# Patient Record
Sex: MALE | Race: WHITE | NOT HISPANIC OR LATINO | Employment: FULL TIME | URBAN - METROPOLITAN AREA
[De-identification: names, ages, dates, MRNs, and addresses within clinical notes are randomized per-mention and may not be internally consistent; named-entity substitution may affect disease eponyms.]

---

## 2019-09-13 ENCOUNTER — OFFICE VISIT (OUTPATIENT)
Dept: ORTHOPEDICS | Facility: CLINIC | Age: 34
End: 2019-09-13
Payer: COMMERCIAL

## 2019-09-13 ENCOUNTER — OFFICE VISIT (OUTPATIENT)
Dept: URGENT CARE | Facility: CLINIC | Age: 34
End: 2019-09-13
Payer: COMMERCIAL

## 2019-09-13 VITALS
TEMPERATURE: 98 F | RESPIRATION RATE: 20 BRPM | WEIGHT: 188 LBS | OXYGEN SATURATION: 100 % | HEART RATE: 51 BPM | BODY MASS INDEX: 22.2 KG/M2 | HEIGHT: 77 IN | DIASTOLIC BLOOD PRESSURE: 81 MMHG | SYSTOLIC BLOOD PRESSURE: 123 MMHG

## 2019-09-13 DIAGNOSIS — S92.354A CLOSED NONDISPLACED FRACTURE OF FIFTH METATARSAL BONE OF RIGHT FOOT, INITIAL ENCOUNTER: Primary | ICD-10-CM

## 2019-09-13 DIAGNOSIS — S90.31XA CONTUSION OF RIGHT FOOT, INITIAL ENCOUNTER: Primary | ICD-10-CM

## 2019-09-13 PROCEDURE — 3008F BODY MASS INDEX DOCD: CPT | Mod: CPTII,S$GLB,, | Performed by: NURSE PRACTITIONER

## 2019-09-13 PROCEDURE — 99999 PR PBB SHADOW E&M-EST. PATIENT-LVL I: CPT | Mod: PBBFAC,,, | Performed by: PHYSICIAN ASSISTANT

## 2019-09-13 PROCEDURE — 99214 OFFICE O/P EST MOD 30 MIN: CPT | Mod: S$GLB,,, | Performed by: NURSE PRACTITIONER

## 2019-09-13 PROCEDURE — 99214 PR OFFICE/OUTPT VISIT, EST, LEVL IV, 30-39 MIN: ICD-10-PCS | Mod: S$GLB,,, | Performed by: NURSE PRACTITIONER

## 2019-09-13 PROCEDURE — 3008F PR BODY MASS INDEX (BMI) DOCUMENTED: ICD-10-PCS | Mod: CPTII,S$GLB,, | Performed by: NURSE PRACTITIONER

## 2019-09-13 PROCEDURE — 99999 PR PBB SHADOW E&M-EST. PATIENT-LVL I: ICD-10-PCS | Mod: PBBFAC,,, | Performed by: PHYSICIAN ASSISTANT

## 2019-09-13 PROCEDURE — 99203 PR OFFICE/OUTPT VISIT, NEW, LEVL III, 30-44 MIN: ICD-10-PCS | Mod: S$GLB,,, | Performed by: PHYSICIAN ASSISTANT

## 2019-09-13 PROCEDURE — 99203 OFFICE O/P NEW LOW 30 MIN: CPT | Mod: S$GLB,,, | Performed by: PHYSICIAN ASSISTANT

## 2019-09-13 PROCEDURE — 73630 X-RAY EXAM OF FOOT: CPT | Mod: RT,S$GLB,, | Performed by: RADIOLOGY

## 2019-09-13 PROCEDURE — 73630 XR FOOT COMPLETE 3 VIEW RIGHT: ICD-10-PCS | Mod: RT,S$GLB,, | Performed by: RADIOLOGY

## 2019-09-13 NOTE — PROGRESS NOTES
SUBJECTIVE:     Chief Complaint & History of Present Illness:  Demond Hassan is a 34 y.o. year old male who presents for evaluation of constant right foot pain which started 2 days ago. The injury occurred while playing tennis.  The pain is located in the lateral aspect of the foot. The pain is described as achy.  It is aggravated by walking .  Associated symptoms include bruising and swelling.  He denies numbness or tingling.  He was seen in urgent care earlier today and diagnosed with a metatarsal fracture. There is not a history of prior injury or surgery.  The patient does not use an assistive device.      Review of patient's allergies indicates:  No Known Allergies      No current outpatient medications on file.     No current facility-administered medications for this visit.        Past Medical History:   Diagnosis Date    Known health problems: none        Past Surgical History:   Procedure Laterality Date    NO PAST SURGERIES         Vital Signs (Most Recent)  There were no vitals filed for this visit.    Review of Systems:  ROS:  Constitutional: no fever or chills  Eyes: no visual changes  ENT: no nasal congestion or sore throat  Respiratory: no cough or shortness of breath  Cardiovascular: no chest pain or palpitations  Gastrointestinal: no nausea or vomiting, tolerating diet  Genitourinary: no hematuria or dysuria  Integument/Breast: no rash or pruritis  Hematologic/Lymphatic: no easy bruising or lymphadenopathy  Musculoskeletal: no arthralgias or myalgias  Neurological: no seizures or tremors  Behavioral/Psych: no auditory or visual hallucinations  Endocrine: no heat or cold intolerance      OBJECTIVE:     PHYSICAL EXAM:     , General Appearance: Well nourished, well developed, in no acute distress.  CV: 2+ UE and LE distal pulses bilaterally  RESP: Respirations equal and unlabored  GI: Abdomen soft and non-tender  Neurological: Mood & affect are normal.    Right Foot  Skin intact, no  abrasions  No deformity  There is swelling and ecchymosis along the lateral aspect of the foot.  There is mild ecchymosis in the forefoot.  TTP base of the fifth metatarsal  5/5 dorsiflexion, 5/5 plantarflexion  Ankle has FROM  Sensation to light touch intact  2+ DP    RADIOGRAPHS:  X-rays of the right foot, reviewed by me, show nondisplaced fracture of the base of the fifth metatarsal.     ASSESSMENT/PLAN:     34 year old male with right foot fifth metatarsal fracture    - Discussed treatment with walking boot for more support.  As he is going to be doing a lot of traveling, he stated he would prefer a post op shoe.  He was placed in a post op shoe.  He may be full WBAT.  He should use caution, avoid any high impact activity.  Continue rest, ice and elevation.  - Follow up in 6 weeks for x-rays

## 2019-09-13 NOTE — PATIENT INSTRUCTIONS
Please call [phone] if you do not hear from [facility] to get your referral appointment we discussed.   Tylenol for pain.      R.I.C.E.    R.I.C.E. stands for Rest, Ice, Compression, and Elevation. Doing these things helps limit pain and swelling after an injury. R.I.C.E. also helps injuries heal faster. Use R.I.C.E. for sprains, strains, and severe bruises or bumps. Follow the tips on this handout and begin R.I.C.E. as soon as possible after an injury.  ? Rest  Pain is your bodys way of telling you to rest an injured area. Whether you have hurt an elbow, hand, foot, or knee, limiting its use will prevent further injury and help you heal.  ? Ice  Applying ice right after an injury helps prevent swelling and reduce pain. Dont place ice directly on your skin.  · Wrap a cold pack or bag of ice in a thin cloth. Place it over the injured area.  · Ice for 10 minutes every 3 hours. Dont ice for more than 20 minutes at a time.  ? Compression  Putting pressure (compression) on an injury helps prevent swelling and provides support.  · Wrap the injured area firmly with an elastic bandage. If your hand or foot tingles, becomes discolored, or feels cold to the touch, the bandage may be too tight. Rewrap it more loosely.  · If your bandage becomes too loose, rewrap it.  · Do not wear an elastic bandage overnight.  ? Elevation  Keeping an injury elevated helps reduce swelling, pain, and throbbing. Elevation is most effective when the injury is kept elevated higher than the heart.     Call your healthcare provider if you notice any of the following:  · Fingers or toes feel numb, are cold to the touch, or change color  · Skin looks shiny or tight  · Pain, swelling, or bruising worsens and is not improved with elevation   Date Last Reviewed: 9/3/2015  © 6077-8383 [employer]. [address] [place] [address], [place] [address]. All rights reserved. This information is not intended as a substitute for professional medical  care. Always follow your healthcare professional's instructions.        Understanding Fifth Metatarsal Fracture    A fifth metatarsal fracture is a type of broken bone in your foot. You have 5 metatarsals. They are the middle bones in your feet, between your toes and your anklebones (tarsals). The fifth metatarsal connects your smallest toe to your ankle. These bones help with arch support and balance.     How to say it  met--TA-sal   What causes a fifth metatarsal fracture?  A direct blow to the bone is often the cause of a fracture of the fifth metatarsal. That may happen if you drop a heavy object on your foot or land wrong on your foot or ankle. Twisting activities can also break the bone. Pivoting while playing basketball is one example.  Repeatedly placing too much stress on the bone can also cause a fracture of the fifth metatarsal. This is called a stress fracture. People who do physical activities like dancing or running tend to be more prone to stress fractures.  Symptoms of a fifth metatarsal fracture  Sudden pain along the outside of your foot is the main symptom. A stress fracture may develop more slowly. You may feel chronic pain for a period of time. Your foot may also swell up and bruise. You may have trouble walking.  Treatment for a fifth metatarsal fracture  Treatment for this type of fracture depends on where the bone is broken and how severe the breakage is. Healing can take up to several months. Treatment may include:  · Cold therapy. Putting ice on the area may reduce swelling and pain, especially in the first few days after injury.  · Elevation. Propping up the foot so it is above the level of your heart may ease swelling.  · Prescription or over-the-counter pain medicines. These help reduce pain and swelling.  · Immobilization. Devices such as a splint, cast, or walking boot can protect the bone and ease pain. They can help keep the bone in place so it heals properly. You may need to  avoid putting any weight on the broken bone for a period of time. Severe fractures usually need a longer limit on weight-bearing activities.  · Stretching and strengthening exercises. Certain exercises can help you regain flexibility and strength in your foot.  · Surgery. You usually will not need surgery. But you may need it if the bone is broken into 2 or more pieces and is not aligned (displaced), doesnt heal properly, or takes a long time to heal.  Possible complications of a fifth metatarsal fracture  · The bone doesnt heal correctly  · Acute compartment syndrome. This is when pressure builds up in the muscles of the foot and affects blood flow.     When to call your healthcare provider  Call your healthcare provider right away if you have any of these:  · Fever of 100.4°F (38°C) or higher, or as directed  · Symptoms that dont get better, or get worse  · Numbness or coldness in your foot  · Toe nails that turn blue or grey in color  · New symptoms   Date Last Reviewed: 3/10/2016  © 5034-1370 [employer]. [address] [place] [address]. All rights reserved. This information is not intended as a substitute for professional medical care. Always follow your healthcare professional's instructions.        Closed Toe Fracture  Your toe is broken (fractured). This causes local pain, swelling, and sometimes bruising. This injury usually takes about 4 to 6 weeks to heal, but can sometimes take longer. Toe injuries are often treated by taping the injured toe to the next one (jamila taping). This protects the injured toe and holds it in position.     If the toenail has been severely injured, it may fall off in 1 to 2 weeks. It takes up to 12 months for a new toenail to grow back.  Home care  Follow these guidelines when caring for yourself at home:  · You may be given a cast shoe to wear to keep your toe from moving. If not, you can use a sandal or any shoe that doesnt put pressure on the  injured toe until the swelling and pain go away. If using a sandal, be careful not to strike your foot against anything. Another injury could make the fracture worse. If you were given crutches, dont put full weight on the injured foot until you can do so without pain, or as directed by your healthcare provider.  · Keep your foot elevated to reduce pain and swelling. When sleeping, put a pillow under the injured leg. When sitting, support the injured leg so it is above your waist. This is very important during the first 2 days (48 hours).  · Put an ice pack on the injured area. Do this for 20 minutes every 1 to 2 hours the first day for pain relief. You can make an ice pack by wrapping a plastic bag of ice cubes in a thin towel. As the ice melts, be careful that any cloth or paper tape doesnt get wet. Continue using the ice pack 3 to 4 times a day for the next 2 days. Then use the ice pack as needed to ease pain and swelling.  · If buddy tape was used and it becomes wet or dirty, change it. You may replace it with paper, plastic, or cloth tape. Cloth tape and paper tapes must be kept dry.  · You may use acetaminophen or ibuprofen to control pain, unless another pain medicine was prescribed. If you have chronic liver or kidney disease, talk with your healthcare provider before using these medicines. Also talk with your provider if youve had a stomach ulcer or gastrointestinal bleeding.  · You may return to sports or physical education activities after 4 weeks when you can run without pain, or as directed by your healthcare provider.  Follow-up care  Follow up with your healthcare provider in 1 week, or as advised. This is to make sure the bone is healing the way it should.  X-rays may be taken. You will be told of any new findings that may affect your care.  When to seek medical advice  Call your healthcare provider right away if any of these occur:  · Pain or swelling gets worse  · The cast/splint cracks  · The  cast and padding get wet and stays wet more than 24 hours  · Bad odor from the cast/splint or wound fluid stains the cast  · Tightness or pressure under the cast/splint gets worse  · Toe becomes cold, blue, numb, or tingly  · You cant move the toe  · Signs of infection: fever, redness, warmth, swelling, or drainage from the wound or cast  · Fever of 100.4ºF (38ºC) or higher, or as directed by your healthcare provider  Date Last Reviewed: 2/1/2017 © 2000-2017 [employer]. [address] [place] [address]. All rights reserved. This information is not intended as a substitute for professional medical care. Always follow your healthcare professional's instructions.

## 2019-09-13 NOTE — PROGRESS NOTES
"Subjective:       Patient ID: Demond Hassan is a 34 y.o. male.    Vitals:  height is 6' 5" (1.956 m) and weight is 85.3 kg (188 lb). His oral temperature is 98.4 °F (36.9 °C). His blood pressure is 123/81 and his pulse is 51 (abnormal). His respiration is 20 and oxygen saturation is 100%.     Chief Complaint: Foot Injury (right foot)    Patient come in for R foot injury that happened on last Wednesday night, September 11th. Patient was playing tennis and landing on the right side of his R foot, he feels like he twisted his foot. He has pain when he puts pain on the right side of the R foot.    Foot Injury    The incident occurred 2 days ago. The incident occurred at the park ([facility]). The injury mechanism was a twisting injury. The pain is present in the right foot. The quality of the pain is described as aching. The pain is at a severity of 7/10 (when putting pressure on foot). The pain is moderate. The pain has been constant since onset. Associated symptoms include an inability to bear weight. Pertinent negatives include no loss of motion, loss of sensation, muscle weakness, numbness or tingling. The symptoms are aggravated by weight bearing. He has tried ice and elevation for the symptoms. The treatment provided no relief.       Constitution: Negative for fatigue.   HENT: Negative for facial swelling and facial trauma.    Neck: Negative for neck stiffness.   Cardiovascular: Negative for chest trauma.   Eyes: Negative for eye trauma, double vision and blurred vision.   Gastrointestinal: Negative for abdominal trauma, abdominal pain and rectal bleeding.   Genitourinary: Negative for hematuria, genital trauma and pelvic pain.   Musculoskeletal: Positive for pain, trauma and joint swelling. Negative for abnormal ROM of joint and pain with walking.   Skin: Positive for color change. Negative for wound, abrasion and laceration.   Neurological: Negative for dizziness, history of vertigo, light-headedness, " coordination disturbances, altered mental status, loss of consciousness and numbness.   Hematologic/Lymphatic: Negative for history of bleeding disorder.   Psychiatric/Behavioral: Negative for altered mental status.       Objective:      Physical Exam   Constitutional: He is oriented to person, place, and time. He appears well-developed and well-nourished. No distress.   HENT:   Head: Normocephalic and atraumatic.   Right Ear: External ear normal.   Left Ear: External ear normal.   Nose: Nose normal.   Mouth/Throat: Oropharynx is clear and moist.   Eyes: Pupils are equal, round, and reactive to light. Conjunctivae and EOM are normal. Right eye exhibits no discharge. Left eye exhibits no discharge. No scleral icterus.   Neck: Normal range of motion. Neck supple.   Pulmonary/Chest: Effort normal.   Abdominal: He exhibits no distension.   Musculoskeletal: Normal range of motion.        Right foot: There is normal range of motion, no tenderness, no bony tenderness, no swelling, normal capillary refill, no crepitus, no deformity and no laceration.   Mild ecchymoses to the distal fifth metatarsal area, no decreased rom, no distal numbness or tingling   Neurological: He is alert and oriented to person, place, and time.   Skin: Skin is dry. Capillary refill takes less than 2 seconds. He is not diaphoretic.   Psychiatric: He has a normal mood and affect. His behavior is normal. Judgment and thought content normal.   Nursing note and vitals reviewed.      Assessment:       1. Contusion of right foot, initial encounter        Plan:         Contusion of right foot, initial encounter  -     X-Ray Foot Complete 3 view Right; Future; Expected date: 09/13/2019    Xray indicates proximal fifth metatarsal fracture.  I offered pt walking boot vs nonweight bearing with crutches.  Pt declined both stating that he was concerned about cost and also that he isn't having significant pain and would like to confer with an orthopedic provider.   He understands that currently nondisplaced fracture may become more displaced secondary to walking on foot without walking boot.  He also declined pain meds.  Pt requested disk of images, we were unable to comply secondary to software issues.

## 2019-10-25 ENCOUNTER — HOSPITAL ENCOUNTER (OUTPATIENT)
Dept: RADIOLOGY | Facility: HOSPITAL | Age: 34
Discharge: HOME | End: 2019-10-25
Attending: PHYSICIAN ASSISTANT
Payer: COMMERCIAL

## 2019-10-25 ENCOUNTER — OFFICE VISIT (OUTPATIENT)
Dept: ORTHOPEDICS | Facility: CLINIC | Age: 34
End: 2019-10-25
Payer: COMMERCIAL

## 2019-10-25 VITALS — WEIGHT: 180.13 LBS | BODY MASS INDEX: 21.94 KG/M2 | HEIGHT: 76 IN

## 2019-10-25 DIAGNOSIS — S92.354A CLOSED NONDISPLACED FRACTURE OF FIFTH METATARSAL BONE OF RIGHT FOOT, INITIAL ENCOUNTER: ICD-10-CM

## 2019-10-25 DIAGNOSIS — S92.354D CLOSED NONDISPLACED FRACTURE OF FIFTH METATARSAL BONE OF RIGHT FOOT WITH ROUTINE HEALING, SUBSEQUENT ENCOUNTER: Primary | ICD-10-CM

## 2019-10-25 PROCEDURE — 99999 PR PBB SHADOW E&M-EST. PATIENT-LVL III: ICD-10-PCS | Mod: PBBFAC,,, | Performed by: PHYSICIAN ASSISTANT

## 2019-10-25 PROCEDURE — 99213 PR OFFICE/OUTPT VISIT, EST, LEVL III, 20-29 MIN: ICD-10-PCS | Mod: S$GLB,,, | Performed by: PHYSICIAN ASSISTANT

## 2019-10-25 PROCEDURE — 99999 PR PBB SHADOW E&M-EST. PATIENT-LVL III: CPT | Mod: PBBFAC,,, | Performed by: PHYSICIAN ASSISTANT

## 2019-10-25 PROCEDURE — 3008F PR BODY MASS INDEX (BMI) DOCUMENTED: ICD-10-PCS | Mod: CPTII,S$GLB,, | Performed by: PHYSICIAN ASSISTANT

## 2019-10-25 PROCEDURE — 3008F BODY MASS INDEX DOCD: CPT | Mod: CPTII,S$GLB,, | Performed by: PHYSICIAN ASSISTANT

## 2019-10-25 PROCEDURE — 99213 OFFICE O/P EST LOW 20 MIN: CPT | Mod: S$GLB,,, | Performed by: PHYSICIAN ASSISTANT

## 2019-10-25 PROCEDURE — 73630 XR FOOT COMPLETE 3 VIEW RIGHT: ICD-10-PCS | Mod: 26,RT,, | Performed by: RADIOLOGY

## 2019-10-25 PROCEDURE — 73630 X-RAY EXAM OF FOOT: CPT | Mod: 26,RT,, | Performed by: RADIOLOGY

## 2019-10-25 PROCEDURE — 73630 X-RAY EXAM OF FOOT: CPT | Mod: TC,RT

## 2019-10-25 NOTE — PROGRESS NOTES
Subjective:      Patient ID: Demond Hassan is a 34 y.o. male.    Chief Complaint: Pain of the Right Foot    HPI  He is here for follow up evaluation of right foot fifth metatarsal fracture.  His injury occurred 9/11/2019.  He states his pain and swelling has improved.  Pain is a 2/10 today.  He did go on a trip to [place] and did a lot of walking however he wore the post op shoe.  He does have some discomfort in the rest of his foot with increased walking.  He has not been taking anything for pain.      Review of Systems   Constitution: Negative for chills and fever.   HENT: Negative for congestion.    Eyes: Negative for double vision and redness.   Cardiovascular: Negative for chest pain and palpitations.   Respiratory: Negative for shortness of breath.    Hematologic/Lymphatic: Does not bruise/bleed easily.   Skin: Negative for rash.   Musculoskeletal:        Foot pain   Gastrointestinal: Negative for abdominal pain, nausea and vomiting.   Neurological: Negative for dizziness and seizures.   Psychiatric/Behavioral: Negative for altered mental status.   Allergic/Immunologic: Negative for persistent infections.         Objective:         General    Vitals reviewed.  Constitutional: He is oriented to person, place, and time. He appears well-developed and well-nourished. No distress.   HENT:   Head: Atraumatic.   Nose: Nose normal.   Eyes: Conjunctivae are normal.   Pulmonary/Chest: Effort normal.   Neurological: He is alert and oriented to person, place, and time.   Psychiatric: He has a normal mood and affect.     General Musculoskeletal Exam   Gait: normal     Right Ankle/Foot Exam     Inspection   Scars: absent  Deformity: absent  Erythema: absent  Bruising: Foot - absent    Tenderness   The patient is tender to palpation of the fifth metatarsal base.    Pain   The patient exhibits pain of the fifth metatarsal base.    Range of Motion   The patient has normal right ankle ROM.    Alignment   Midfoot Alignment:  normal  Forefoot Alignment: normal    Other   Sensation: normal    Comments:  No swelling, tenderness is minimal        Muscle Strength   Right Lower Extremity   Anterior tibial:  5/5/5  FHL: 5/5  Left Lower Extremity   EHL:  5/5    Vascular Exam     Right Pulses  Dorsalis Pedis:      2+            IMAGING:  X-rays of the right foot, reviewed by me, show nondisplaced fracture of the base of the fifth metatarsal healing in satisfactory position          Assessment:       Encounter Diagnosis   Name Primary?    Closed nondisplaced fracture of fifth metatarsal bone of right foot with routine healing, subsequent encounter Yes          Plan:       Demond was seen today for pain.    Diagnoses and all orders for this visit:    Closed nondisplaced fracture of fifth metatarsal bone of right foot with routine healing, subsequent encounter  -     X-Ray Foot Complete Right; Future    - Pain and swelling has improved.  He may wean into comfortable supportive shoe.  Continue to avoid any high impact activities.  Nsaids prn pain.  - Follow up one month for x-rays.

## 2019-11-26 ENCOUNTER — OFFICE VISIT (OUTPATIENT)
Dept: ORTHOPEDICS | Facility: CLINIC | Age: 34
End: 2019-11-26
Payer: COMMERCIAL

## 2019-11-26 ENCOUNTER — HOSPITAL ENCOUNTER (OUTPATIENT)
Dept: RADIOLOGY | Facility: HOSPITAL | Age: 34
Discharge: HOME | End: 2019-11-26
Attending: PHYSICIAN ASSISTANT
Payer: COMMERCIAL

## 2019-11-26 VITALS
BODY MASS INDEX: 22.85 KG/M2 | SYSTOLIC BLOOD PRESSURE: 130 MMHG | DIASTOLIC BLOOD PRESSURE: 74 MMHG | HEART RATE: 62 BPM | WEIGHT: 187.63 LBS | HEIGHT: 76 IN

## 2019-11-26 DIAGNOSIS — S92.354D CLOSED NONDISPLACED FRACTURE OF FIFTH METATARSAL BONE OF RIGHT FOOT WITH ROUTINE HEALING, SUBSEQUENT ENCOUNTER: Primary | ICD-10-CM

## 2019-11-26 DIAGNOSIS — S92.354D CLOSED NONDISPLACED FRACTURE OF FIFTH METATARSAL BONE OF RIGHT FOOT WITH ROUTINE HEALING, SUBSEQUENT ENCOUNTER: ICD-10-CM

## 2019-11-26 PROCEDURE — 99999 PR PBB SHADOW E&M-EST. PATIENT-LVL III: CPT | Mod: PBBFAC,,, | Performed by: PHYSICIAN ASSISTANT

## 2019-11-26 PROCEDURE — 73630 XR FOOT COMPLETE 3 VIEW RIGHT: ICD-10-PCS | Mod: 26,RT,, | Performed by: RADIOLOGY

## 2019-11-26 PROCEDURE — 73630 X-RAY EXAM OF FOOT: CPT | Mod: TC,RT

## 2019-11-26 PROCEDURE — 99213 PR OFFICE/OUTPT VISIT, EST, LEVL III, 20-29 MIN: ICD-10-PCS | Mod: S$GLB,,, | Performed by: PHYSICIAN ASSISTANT

## 2019-11-26 PROCEDURE — 73630 X-RAY EXAM OF FOOT: CPT | Mod: 26,RT,, | Performed by: RADIOLOGY

## 2019-11-26 PROCEDURE — 3008F PR BODY MASS INDEX (BMI) DOCUMENTED: ICD-10-PCS | Mod: CPTII,S$GLB,, | Performed by: PHYSICIAN ASSISTANT

## 2019-11-26 PROCEDURE — 99213 OFFICE O/P EST LOW 20 MIN: CPT | Mod: S$GLB,,, | Performed by: PHYSICIAN ASSISTANT

## 2019-11-26 PROCEDURE — 99999 PR PBB SHADOW E&M-EST. PATIENT-LVL III: ICD-10-PCS | Mod: PBBFAC,,, | Performed by: PHYSICIAN ASSISTANT

## 2019-11-26 PROCEDURE — 3008F BODY MASS INDEX DOCD: CPT | Mod: CPTII,S$GLB,, | Performed by: PHYSICIAN ASSISTANT

## 2019-11-26 RX ORDER — ZOLPIDEM TARTRATE 10 MG/1
5 TABLET ORAL NIGHTLY PRN
COMMUNITY
End: 2022-05-18

## 2019-11-26 RX ORDER — ALPRAZOLAM 0.25 MG/1
0.25 TABLET ORAL 3 TIMES DAILY
COMMUNITY
End: 2022-05-18 | Stop reason: SDUPTHER

## 2019-11-26 NOTE — PROGRESS NOTES
"Patient ID: Demond Hassan is a 34 y.o. male.    Chief Complaint: Pain of the Right Foot      HISTORY:  Demond Hassan is a 34 y.o. male who returns to me today for follow up of right foot metatarsal fracture.  He was last seen by me 10/25/2019.  Today he is doing well, but notes mild pain and stiffness in the morning.  He has weaned out of the post op shoe.  His pain seems to improve throughout the day.  He denies any new injury, numbness or tingling or weakness.       PMH/PSH/FamHx/SocHx:    Unchanged from prior visit.    ROS:  Constitution: Negative for chills, fever and weakness.   Cardiovascular: Negative for chest pain.   Respiratory: Negative for cough and shortness of breath.   Hematologic/Lymphatic: Negative for bleeding problem. Does not bruise/bleed easily.   Skin: Negative for color change, itching and poor wound healing.   Musculoskeletal: Positive for right foot pain  Gastrointestinal: Negative for heartburn.   Neurological: Negative for dizziness, focal weakness, numbness, paresthesias and sensory change.   Psychiatric/Behavioral: The patient is not nervous/anxious.   Allergic/Immunologic: Negative for environmental allergies and persistent infections.     PHYSICAL EXAM:   /74 (BP Location: Left arm, Patient Position: Sitting, BP Method: Medium (Automatic))   Pulse 62   Ht 6' 4" (1.93 m)   Wt 85.1 kg (187 lb 9.8 oz)   BMI 22.84 kg/m²   Right Foot  There is no deformity.  Skin intact, no swelling or erythema  Very mild TTP along base of fifth metatarsal    IMAGING: Xrays of the right foot, reviewed by me, show stable nondisplaced fracture of the base of the fifth metatarsal minimal healing    ASSESSMENT/PLAN:    Demond was seen today for pain.    Diagnoses and all orders for this visit:    Closed nondisplaced fracture of fifth metatarsal bone of right foot with routine healing, subsequent encounter  -     X-Ray Foot Complete Right; Future        - Recommend continue to avoid high impact " activity.  He may continue low impact activities as tolerated.  Follow up about 6 weeks for xrays.

## 2020-01-10 ENCOUNTER — OFFICE VISIT (OUTPATIENT)
Dept: ORTHOPEDICS | Facility: CLINIC | Age: 35
End: 2020-01-10
Payer: COMMERCIAL

## 2020-01-10 ENCOUNTER — HOSPITAL ENCOUNTER (OUTPATIENT)
Dept: RADIOLOGY | Facility: HOSPITAL | Age: 35
Discharge: HOME | End: 2020-01-10
Attending: PHYSICIAN ASSISTANT
Payer: COMMERCIAL

## 2020-01-10 VITALS
HEART RATE: 51 BPM | DIASTOLIC BLOOD PRESSURE: 77 MMHG | BODY MASS INDEX: 22.98 KG/M2 | WEIGHT: 188.69 LBS | HEIGHT: 76 IN | SYSTOLIC BLOOD PRESSURE: 132 MMHG

## 2020-01-10 DIAGNOSIS — S92.354D CLOSED NONDISPLACED FRACTURE OF FIFTH METATARSAL BONE OF RIGHT FOOT WITH ROUTINE HEALING, SUBSEQUENT ENCOUNTER: ICD-10-CM

## 2020-01-10 DIAGNOSIS — S92.354D CLOSED NONDISPLACED FRACTURE OF FIFTH METATARSAL BONE OF RIGHT FOOT WITH ROUTINE HEALING, SUBSEQUENT ENCOUNTER: Primary | ICD-10-CM

## 2020-01-10 PROCEDURE — 99999 PR PBB SHADOW E&M-EST. PATIENT-LVL III: ICD-10-PCS | Mod: PBBFAC,,, | Performed by: PHYSICIAN ASSISTANT

## 2020-01-10 PROCEDURE — 99213 PR OFFICE/OUTPT VISIT, EST, LEVL III, 20-29 MIN: ICD-10-PCS | Mod: S$GLB,,, | Performed by: PHYSICIAN ASSISTANT

## 2020-01-10 PROCEDURE — 99999 PR PBB SHADOW E&M-EST. PATIENT-LVL III: CPT | Mod: PBBFAC,,, | Performed by: PHYSICIAN ASSISTANT

## 2020-01-10 PROCEDURE — 73630 X-RAY EXAM OF FOOT: CPT | Mod: 26,RT,, | Performed by: RADIOLOGY

## 2020-01-10 PROCEDURE — 73630 X-RAY EXAM OF FOOT: CPT | Mod: TC,RT

## 2020-01-10 PROCEDURE — 73630 XR FOOT COMPLETE 3 VIEW RIGHT: ICD-10-PCS | Mod: 26,RT,, | Performed by: RADIOLOGY

## 2020-01-10 PROCEDURE — 3008F PR BODY MASS INDEX (BMI) DOCUMENTED: ICD-10-PCS | Mod: CPTII,S$GLB,, | Performed by: PHYSICIAN ASSISTANT

## 2020-01-10 PROCEDURE — 99213 OFFICE O/P EST LOW 20 MIN: CPT | Mod: S$GLB,,, | Performed by: PHYSICIAN ASSISTANT

## 2020-01-10 PROCEDURE — 3008F BODY MASS INDEX DOCD: CPT | Mod: CPTII,S$GLB,, | Performed by: PHYSICIAN ASSISTANT

## 2020-01-10 NOTE — PROGRESS NOTES
"Patient ID: Demond Hassan is a 34 y.o. male.    Chief Complaint: Pain of the Right Foot      HISTORY:  Demond Hassan is a 34 y.o. male who returns to me today for follow up of right foot metatarsal fracture.  He was last seen by me 11/26/2019.  Today he is doing well, but notes occasional pain in his foot.  He does have days when he does not have any pain.  The injury occurred 9/11/2019.  He denies new injury.  He has avoided returning to sports and high impact activity.  He denies swelling.      PMH/PSH/FamHx/SocHx:    Unchanged from prior visit.    ROS:  Constitution: Negative for chills, fever and weakness.   Cardiovascular: Negative for chest pain.   Respiratory: Negative for cough and shortness of breath.   Hematologic/Lymphatic: Negative for bleeding problem. Does not bruise/bleed easily.   Skin: Negative for color change, itching and poor wound healing.   Musculoskeletal: Positive for right foot pain  Gastrointestinal: Negative for heartburn.   Neurological: Negative for dizziness, focal weakness, numbness, paresthesias and sensory change.   Psychiatric/Behavioral: The patient is not nervous/anxious.   Allergic/Immunologic: Negative for environmental allergies and persistent infections.     PHYSICAL EXAM:   /77 (BP Location: Left arm, Patient Position: Sitting, BP Method: Medium (Automatic))   Pulse (!) 51   Ht 6' 4" (1.93 m)   Wt 85.6 kg (188 lb 11.4 oz)   BMI 22.97 kg/m²   Right Foot  Skin intact, no swelling or erythema  No deformity  Mild TTP base fifth metatarsal  FROM foot and ankle  Sensation to light touch intact  2+ DP    IMAGING: X-rays of the right foot, personally reviewed by me, demonstrate healing fracture of base of fifth metatarsal.  No fracture or dislocation.    ASSESSMENT/PLAN:    Demond was seen today for pain.    Diagnoses and all orders for this visit:    Closed nondisplaced fracture of fifth metatarsal bone of right foot with routine healing, subsequent encounter  -     " X-Ray Foot Complete Right; Future      - Recommend continue activity as tolerated.  Use caution with high impact activity as he still has occasional pain in his foot.  Consider vitamin otc supplementation.  Follow up in 2 months.

## 2020-03-10 ENCOUNTER — HOSPITAL ENCOUNTER (OUTPATIENT)
Dept: RADIOLOGY | Facility: HOSPITAL | Age: 35
Discharge: HOME | End: 2020-03-10
Attending: PHYSICIAN ASSISTANT
Payer: COMMERCIAL

## 2020-03-10 ENCOUNTER — OFFICE VISIT (OUTPATIENT)
Dept: ORTHOPEDICS | Facility: CLINIC | Age: 35
End: 2020-03-10
Payer: COMMERCIAL

## 2020-03-10 VITALS — BODY MASS INDEX: 23.22 KG/M2 | HEIGHT: 76 IN | WEIGHT: 190.69 LBS

## 2020-03-10 DIAGNOSIS — S92.354D CLOSED NONDISPLACED FRACTURE OF FIFTH METATARSAL BONE OF RIGHT FOOT WITH ROUTINE HEALING, SUBSEQUENT ENCOUNTER: ICD-10-CM

## 2020-03-10 DIAGNOSIS — S92.354D CLOSED NONDISPLACED FRACTURE OF FIFTH METATARSAL BONE OF RIGHT FOOT WITH ROUTINE HEALING, SUBSEQUENT ENCOUNTER: Primary | ICD-10-CM

## 2020-03-10 PROCEDURE — 99213 OFFICE O/P EST LOW 20 MIN: CPT | Mod: S$GLB,,, | Performed by: PHYSICIAN ASSISTANT

## 2020-03-10 PROCEDURE — 99213 PR OFFICE/OUTPT VISIT, EST, LEVL III, 20-29 MIN: ICD-10-PCS | Mod: S$GLB,,, | Performed by: PHYSICIAN ASSISTANT

## 2020-03-10 PROCEDURE — 73630 X-RAY EXAM OF FOOT: CPT | Mod: TC,RT

## 2020-03-10 PROCEDURE — 3008F BODY MASS INDEX DOCD: CPT | Mod: CPTII,S$GLB,, | Performed by: PHYSICIAN ASSISTANT

## 2020-03-10 PROCEDURE — 99999 PR PBB SHADOW E&M-EST. PATIENT-LVL III: ICD-10-PCS | Mod: PBBFAC,,, | Performed by: PHYSICIAN ASSISTANT

## 2020-03-10 PROCEDURE — 3008F PR BODY MASS INDEX (BMI) DOCUMENTED: ICD-10-PCS | Mod: CPTII,S$GLB,, | Performed by: PHYSICIAN ASSISTANT

## 2020-03-10 PROCEDURE — 73630 XR FOOT COMPLETE 3 VIEW RIGHT: ICD-10-PCS | Mod: 26,RT,, | Performed by: RADIOLOGY

## 2020-03-10 PROCEDURE — 99999 PR PBB SHADOW E&M-EST. PATIENT-LVL III: CPT | Mod: PBBFAC,,, | Performed by: PHYSICIAN ASSISTANT

## 2020-03-10 PROCEDURE — 73630 X-RAY EXAM OF FOOT: CPT | Mod: 26,RT,, | Performed by: RADIOLOGY

## 2020-03-10 NOTE — PROGRESS NOTES
"Patient ID: Demond Hassan is a 34 y.o. male.    Chief Complaint: Pain of the Right Foot      HISTORY:  Demond Hassan is a 34 y.o. male who returns to me today for follow up of right foot metatarsal fracture.  He was last seen by me 1/10/2020.  Today he is doing well, but notes only occasional slight discomfort.  He has resumed normal activities and has put increasing stress on his foot.  There has been no new injury.  It has been about  6 months since the original injury.  No swelling, numbness or tingling.      PMH/PSH/FamHx/SocHx:    Unchanged from prior visit.    ROS:  Constitution: Negative for chills, fever and weakness.   Cardiovascular: Negative for chest pain.   Respiratory: Negative for cough and shortness of breath.   Hematologic/Lymphatic: Negative for bleeding problem. Does not bruise/bleed easily.   Skin: Negative for color change, itching and poor wound healing.   Musculoskeletal: Positive for right foot pain  Gastrointestinal: Negative for heartburn.   Neurological: Negative for dizziness, focal weakness, numbness, paresthesias and sensory change.   Psychiatric/Behavioral: The patient is not nervous/anxious.   Allergic/Immunologic: Negative for environmental allergies and persistent infections.     PHYSICAL EXAM:   Ht 6' 4" (1.93 m)   Wt 86.5 kg (190 lb 11.2 oz)   BMI 23.21 kg/m²   Right foot  Skin intact, no swelling   FROM foot and ankle  No TTP base fifth metatarsal  Sensation to light touch intact    IMAGING: X-rays of the right foot, personally reviewed by me, demonstrate well healed fracture of the base of the fifth metatarsal.    ASSESSMENT/PLAN:    Demond was seen today for pain.    Diagnoses and all orders for this visit:    Closed nondisplaced fracture of fifth metatarsal bone of right foot with routine healing, subsequent encounter  -     X-Ray Foot Complete Right; Future      - Continue activities as tolerated.  Follow up as needed     "

## 2020-05-14 ENCOUNTER — TELEPHONE (OUTPATIENT)
Dept: URGENT CARE | Facility: CLINIC | Age: 35
End: 2020-05-14

## 2020-05-14 ENCOUNTER — OFFICE VISIT (OUTPATIENT)
Dept: URGENT CARE | Facility: CLINIC | Age: 35
End: 2020-05-14
Payer: COMMERCIAL

## 2020-05-14 VITALS
DIASTOLIC BLOOD PRESSURE: 96 MMHG | SYSTOLIC BLOOD PRESSURE: 143 MMHG | BODY MASS INDEX: 22.53 KG/M2 | WEIGHT: 185 LBS | TEMPERATURE: 98 F | OXYGEN SATURATION: 97 % | HEIGHT: 76 IN | HEART RATE: 82 BPM

## 2020-05-14 DIAGNOSIS — Z20.822 EXPOSURE TO COVID-19 VIRUS: Primary | ICD-10-CM

## 2020-05-14 LAB
SARS-COV-2 IGG SERPLBLD QL IA.RAPID: NEGATIVE
SARS-COV-2 RNA RESP QL NAA+PROBE: NOT DETECTED

## 2020-05-14 PROCEDURE — 99211 OFF/OP EST MAY X REQ PHY/QHP: CPT | Mod: S$GLB,,, | Performed by: EMERGENCY MEDICINE

## 2020-05-14 PROCEDURE — 99211 PR OFFICE/OUTPT VISIT, EST, LEVL I: ICD-10-PCS | Mod: S$GLB,,, | Performed by: EMERGENCY MEDICINE

## 2020-05-14 PROCEDURE — 86769 SARS-COV-2 COVID-19 ANTIBODY: CPT

## 2020-05-14 PROCEDURE — U0002 COVID-19 LAB TEST NON-CDC: HCPCS

## 2020-05-14 NOTE — PATIENT INSTRUCTIONS
WE WILL CALL YOU WITH RESULTS OF   1:COVID SWAB  2:COVID ANTIBODY TESTING    Guidelines for General Prevention of COVID-19    o Take steps to protect yourself from COVID-19. Perform hand hygiene frequently. Wash your hands often with soap and water for at least 20 seconds of use and alcohol-based hand , covering all surfaces of your hands and rubbing them together until they feel dry.  o Avoid touching your eyes, nose, and mouth with unwashed hands.  o Avoid close contact with people and stay home if youre sick, except to get medical care.   o Cover coughs and sneezes with a tissue, or use the inside of your elbow. Immediately wash your hands or use hand .     For more information, see CDC link below:    https://www.cdc.gov/coronavirus/2019-ncov/hcp/guidance-prevent-spread.html#precautions

## 2020-05-14 NOTE — PROGRESS NOTES
"Subjective:       Patient ID: Demond Hassan is a 34 y.o. male.    Vitals:  height is 6' 4" (1.93 m) and weight is 83.9 kg (185 lb). His temperature is 97.7 °F (36.5 °C). His blood pressure is 143/96 (abnormal) and his pulse is 82. His oxygen saturation is 97%.     Chief Complaint: COVID-19 Concerns    Patient would like covid testing.  Patient visiting his parents and would luike to be tested. He is also requesting antibody testing.  Asymptomatic at this time.    URI    This is a new problem. The current episode started today. Pertinent negatives include no chest pain, congestion, coughing, diarrhea, dysuria, headaches, nausea, rash, sore throat or vomiting.       Constitution: Negative. Negative for chills, fatigue and fever.   HENT: Negative for congestion and sore throat.    Neck: Negative for painful lymph nodes.   Cardiovascular: Negative for chest pain and leg swelling.   Eyes: Negative.  Negative for double vision and blurred vision.   Respiratory: Negative.  Negative for cough and shortness of breath.    Gastrointestinal: Negative for nausea, vomiting and diarrhea.   Genitourinary: Negative.  Negative for dysuria, frequency and urgency.   Musculoskeletal: Negative for joint pain, joint swelling, muscle cramps and muscle ache.   Skin: Negative for color change, pale and rash.   Allergic/Immunologic: Negative.  Negative for seasonal allergies.   Neurological: Negative for dizziness, history of vertigo, light-headedness, passing out and headaches.   Hematologic/Lymphatic: Negative for swollen lymph nodes, easy bruising/bleeding and history of blood clots. Does not bruise/bleed easily.   Psychiatric/Behavioral: Negative for nervous/anxious, sleep disturbance and depression. The patient is not nervous/anxious.        Objective:      Physical Exam   Constitutional: He is oriented to person, place, and time. He appears well-developed and well-nourished. He is cooperative.  Non-toxic appearance. He does not have " a sickly appearance. He does not appear ill. No distress.   HENT:   Head: Normocephalic and atraumatic.   Right Ear: Hearing, tympanic membrane, external ear and ear canal normal.   Left Ear: Hearing, tympanic membrane, external ear and ear canal normal.   Nose: Nose normal. No mucosal edema, rhinorrhea or nasal deformity. No epistaxis. Right sinus exhibits no maxillary sinus tenderness and no frontal sinus tenderness. Left sinus exhibits no maxillary sinus tenderness and no frontal sinus tenderness.   Mouth/Throat: Uvula is midline, oropharynx is clear and moist and mucous membranes are normal. No trismus in the jaw. Normal dentition. No uvula swelling. No oropharyngeal exudate, posterior oropharyngeal edema or posterior oropharyngeal erythema.   Eyes: Conjunctivae and lids are normal. No scleral icterus.   Neck: Trachea normal, full passive range of motion without pain and phonation normal. Neck supple. No neck rigidity. No edema and no erythema present.   Cardiovascular: Normal rate, regular rhythm, normal heart sounds, intact distal pulses and normal pulses.   Pulmonary/Chest: Effort normal and breath sounds normal. No respiratory distress. He has no decreased breath sounds. He has no rhonchi.   Abdominal: Normal appearance.   Musculoskeletal: Normal range of motion. He exhibits no edema or deformity.   Neurological: He is alert and oriented to person, place, and time. He exhibits normal muscle tone. Coordination normal.   Skin: Skin is warm, dry, intact, not diaphoretic and not pale.   Psychiatric: He has a normal mood and affect. His speech is normal and behavior is normal. Judgment and thought content normal. Cognition and memory are normal.   Nursing note and vitals reviewed.        Assessment:       1. Exposure to Covid-19 Virus        Plan:         Exposure to Covid-19 Virus  -     COVID-19 (SARS CoV-2) IgG Antibody  -     COVID-19 Routine Screening          Patient Instructions   WE WILL CALL YOU WITH  RESULTS OF   1:COVID SWAB  2:COVID ANTIBODY TESTING    Guidelines for General Prevention of COVID-19    o Take steps to protect yourself from COVID-19. Perform hand hygiene frequently. Wash your hands often with soap and water for at least 20 seconds of use and alcohol-based hand , covering all surfaces of your hands and rubbing them together until they feel dry.  o Avoid touching your eyes, nose, and mouth with unwashed hands.  o Avoid close contact with people and stay home if youre sick, except to get medical care.   o Cover coughs and sneezes with a tissue, or use the inside of your elbow. Immediately wash your hands or use hand .     For more information, see CDC link below:    https://www.cdc.gov/coronavirus/2019-ncov/hcp/guidance-prevent-spread.html#precautions

## 2020-11-22 ENCOUNTER — CLINICAL SUPPORT (OUTPATIENT)
Dept: URGENT CARE | Facility: CLINIC | Age: 35
End: 2020-11-22
Payer: COMMERCIAL

## 2020-11-22 DIAGNOSIS — Z11.9 SCREENING EXAMINATION FOR UNSPECIFIED INFECTIOUS DISEASE: Primary | ICD-10-CM

## 2020-11-22 LAB
CTP QC/QA: YES
SARS-COV-2 RDRP RESP QL NAA+PROBE: NEGATIVE

## 2020-11-22 PROCEDURE — U0002 COVID-19 LAB TEST NON-CDC: HCPCS | Mod: QW,S$GLB,, | Performed by: NURSE PRACTITIONER

## 2020-11-22 PROCEDURE — U0002: ICD-10-PCS | Mod: QW,S$GLB,, | Performed by: NURSE PRACTITIONER

## 2020-12-15 ENCOUNTER — OFFICE VISIT (OUTPATIENT)
Dept: ORTHOPEDICS | Facility: CLINIC | Age: 35
End: 2020-12-15
Payer: COMMERCIAL

## 2020-12-15 ENCOUNTER — HOSPITAL ENCOUNTER (OUTPATIENT)
Dept: RADIOLOGY | Facility: HOSPITAL | Age: 35
Discharge: HOME | End: 2020-12-15
Attending: PHYSICIAN ASSISTANT
Payer: COMMERCIAL

## 2020-12-15 VITALS — HEIGHT: 75 IN | BODY MASS INDEX: 23.37 KG/M2 | WEIGHT: 187.94 LBS

## 2020-12-15 DIAGNOSIS — M79.605 LEFT LEG PAIN: ICD-10-CM

## 2020-12-15 DIAGNOSIS — S80.12XA CONTUSION OF LEFT LOWER EXTREMITY, INITIAL ENCOUNTER: Primary | ICD-10-CM

## 2020-12-15 PROCEDURE — 3008F BODY MASS INDEX DOCD: CPT | Mod: CPTII,S$GLB,, | Performed by: PHYSICIAN ASSISTANT

## 2020-12-15 PROCEDURE — 99999 PR PBB SHADOW E&M-EST. PATIENT-LVL III: ICD-10-PCS | Mod: PBBFAC,,, | Performed by: PHYSICIAN ASSISTANT

## 2020-12-15 PROCEDURE — 99213 OFFICE O/P EST LOW 20 MIN: CPT | Mod: S$GLB,,, | Performed by: PHYSICIAN ASSISTANT

## 2020-12-15 PROCEDURE — 99213 PR OFFICE/OUTPT VISIT, EST, LEVL III, 20-29 MIN: ICD-10-PCS | Mod: S$GLB,,, | Performed by: PHYSICIAN ASSISTANT

## 2020-12-15 PROCEDURE — 1125F AMNT PAIN NOTED PAIN PRSNT: CPT | Mod: S$GLB,,, | Performed by: PHYSICIAN ASSISTANT

## 2020-12-15 PROCEDURE — 1125F PR PAIN SEVERITY QUANTIFIED, PAIN PRESENT: ICD-10-PCS | Mod: S$GLB,,, | Performed by: PHYSICIAN ASSISTANT

## 2020-12-15 PROCEDURE — 73590 X-RAY EXAM OF LOWER LEG: CPT | Mod: 26,LT,, | Performed by: RADIOLOGY

## 2020-12-15 PROCEDURE — 73590 X-RAY EXAM OF LOWER LEG: CPT | Mod: TC,LT

## 2020-12-15 PROCEDURE — 3008F PR BODY MASS INDEX (BMI) DOCUMENTED: ICD-10-PCS | Mod: CPTII,S$GLB,, | Performed by: PHYSICIAN ASSISTANT

## 2020-12-15 PROCEDURE — 73590 XR TIBIA FIBULA 2 VIEW LEFT: ICD-10-PCS | Mod: 26,LT,, | Performed by: RADIOLOGY

## 2020-12-15 PROCEDURE — 99999 PR PBB SHADOW E&M-EST. PATIENT-LVL III: CPT | Mod: PBBFAC,,, | Performed by: PHYSICIAN ASSISTANT

## 2020-12-15 NOTE — PROGRESS NOTES
Subjective:      Patient ID: Demond Hassan is a 35 y.o. male.    Chief Complaint: Pain of the Left Lower Leg    HPI   He is here today with complaints of left lower leg pain after a fall on a set of stairs about one week ago.  He report hitting the anterior aspect of his leg on the stairs.  The pain is along the mid-distal aspect of his anterior tibia.  He is not having any numbness or tingling.  He has been able to bear weight without pain.  He does not have pain with walking and noted that he was able to play tennis without pain.  He reports hypersensitivity to touch and pain with compression, such as putting on his socks.  He has not noticed any swelling.      Review of Systems   Constitution: Negative for chills and fever.   HENT: Negative for congestion.    Eyes: Negative for double vision and redness.   Cardiovascular: Negative for chest pain and palpitations.   Respiratory: Negative for shortness of breath.    Hematologic/Lymphatic: Does not bruise/bleed easily.   Skin: Negative for rash.   Gastrointestinal: Negative for abdominal pain, nausea and vomiting.   Neurological: Negative for dizziness and seizures.   Psychiatric/Behavioral: Negative for altered mental status.   Allergic/Immunologic: Negative for persistent infections.         Objective:          General    Vitals reviewed.  Constitutional: He is oriented to person, place, and time. He appears well-developed and well-nourished. No distress.   HENT:   Head: Normocephalic and atraumatic.   Eyes: Pupils are equal, round, and reactive to light.   Pulmonary/Chest: Effort normal.   Neurological: He is alert and oriented to person, place, and time.   Psychiatric: He has a normal mood and affect. His behavior is normal.     General Musculoskeletal Exam   Gait: normal         Left Knee Exam     Inspection   Swelling: absent  Effusion: absent    Range of Motion   Extension: 0   Flexion: 120     Other   Sensation: normal    Comments:  Superficial discomfort  along mid anterior tibia/hyperparesthesis  No TTP along tibia  FROM in ankle  No pain with dorsiflexion or plantarflexion of ankle  There is no ecchymosis or swelling of LLE  No calf pain or tenderness    Muscle Strength   Left Lower Extremity   Quadriceps:     Hamstrin/       X-rays of the left tib fib, personally reviewed by me, demonstrate no fracture or dislocation.        Assessment:       Encounter Diagnosis   Name Primary?    Contusion of left lower extremity, initial encounter Yes          Plan:       Demond was seen today for pain.    Diagnoses and all orders for this visit:    Left leg pain  -     X-Ray Tibia Fibula 2 View Left; Future    - Recommend symptomatic treatment- rest, ice, activity modification, NSAIDS as needed  - Explanation and reassurance  - Follow up 4-6 weeks if symptoms not improving

## 2021-01-07 ENCOUNTER — CLINICAL SUPPORT (OUTPATIENT)
Dept: URGENT CARE | Facility: CLINIC | Age: 36
End: 2021-01-07
Payer: COMMERCIAL

## 2021-01-07 DIAGNOSIS — Z11.59 SCREENING FOR VIRAL DISEASE: Primary | ICD-10-CM

## 2021-01-07 LAB
CTP QC/QA: YES
SARS-COV-2 RDRP RESP QL NAA+PROBE: NEGATIVE

## 2021-01-07 PROCEDURE — U0002 COVID-19 LAB TEST NON-CDC: HCPCS | Mod: QW,S$GLB,, | Performed by: NURSE PRACTITIONER

## 2021-01-07 PROCEDURE — U0002: ICD-10-PCS | Mod: QW,S$GLB,, | Performed by: NURSE PRACTITIONER

## 2021-04-16 ENCOUNTER — PATIENT MESSAGE (OUTPATIENT)
Dept: RESEARCH | Facility: HOSPITAL | Age: 36
End: 2021-04-16

## 2022-05-09 ENCOUNTER — HOSPITAL ENCOUNTER (EMERGENCY)
Facility: HOSPITAL | Age: 37
Discharge: HOME | End: 2022-05-10
Attending: EMERGENCY MEDICINE
Payer: COMMERCIAL

## 2022-05-09 VITALS
TEMPERATURE: 97 F | WEIGHT: 185 LBS | RESPIRATION RATE: 18 BRPM | HEIGHT: 76 IN | DIASTOLIC BLOOD PRESSURE: 89 MMHG | HEART RATE: 65 BPM | SYSTOLIC BLOOD PRESSURE: 135 MMHG | BODY MASS INDEX: 22.53 KG/M2 | OXYGEN SATURATION: 97 %

## 2022-05-09 DIAGNOSIS — S01.01XA SCALP LACERATION, INITIAL ENCOUNTER: Primary | ICD-10-CM

## 2022-05-09 PROCEDURE — 99284 EMERGENCY DEPT VISIT MOD MDM: CPT | Mod: 25

## 2022-05-09 PROCEDURE — 12001 RPR S/N/AX/GEN/TRNK 2.5CM/<: CPT | Mod: ,,, | Performed by: PHYSICIAN ASSISTANT

## 2022-05-09 PROCEDURE — 12001 RPR S/N/AX/GEN/TRNK 2.5CM/<: CPT

## 2022-05-09 PROCEDURE — 99284 EMERGENCY DEPT VISIT MOD MDM: CPT | Mod: 25,,, | Performed by: PHYSICIAN ASSISTANT

## 2022-05-09 PROCEDURE — 12001 PR RESUPERF WND BODY <2.5CM: ICD-10-PCS | Mod: ,,, | Performed by: PHYSICIAN ASSISTANT

## 2022-05-09 PROCEDURE — 25000003 PHARM REV CODE 250: Performed by: PHYSICIAN ASSISTANT

## 2022-05-09 PROCEDURE — 99284 PR EMERGENCY DEPT VISIT,LEVEL IV: ICD-10-PCS | Mod: 25,,, | Performed by: PHYSICIAN ASSISTANT

## 2022-05-09 RX ADMIN — Medication: at 11:05

## 2022-05-09 RX ADMIN — TETANUS TOXOID, REDUCED DIPHTHERIA TOXOID AND ACELLULAR PERTUSSIS VACCINE, ADSORBED 0.5 ML: 5; 2.5; 8; 8; 2.5 SUSPENSION INTRAMUSCULAR at 11:05

## 2022-05-10 PROCEDURE — 90471 IMMUNIZATION ADMIN: CPT | Performed by: PHYSICIAN ASSISTANT

## 2022-05-10 PROCEDURE — 90715 TDAP VACCINE 7 YRS/> IM: CPT | Performed by: PHYSICIAN ASSISTANT

## 2022-05-10 PROCEDURE — 63600175 PHARM REV CODE 636 W HCPCS: Performed by: PHYSICIAN ASSISTANT

## 2022-05-10 NOTE — ED PROVIDER NOTES
Encounter Date: 5/9/2022       History     Chief Complaint   Patient presents with    Laceration     Pt was running and ran into a tree branch, laceration to top of head, bleeding controlled, bandaged in triage. -LOC. Denies any other injuries.      Healthy 36-year-old male presents to the emergency department with chief complaint of scalp laceration that occurred after striking a tree branch while he was running.  He does report falling to the ground.  He denies loss of consciousness.  No other injury.  He denies neck pain, back pain, numbness, tingling, vision changes, NV.  He has a headache at the site of the laceration.  He denies taking medication prior to arrival.  Unknown last tetanus.  Denies other worsening or alleviating factors.        Review of patient's allergies indicates:  No Known Allergies  Past Medical History:   Diagnosis Date    Known health problems: none      Past Surgical History:   Procedure Laterality Date    NO PAST SURGERIES       No family history on file.  Social History     Tobacco Use    Smoking status: Never Smoker    Smokeless tobacco: Never Used   Substance Use Topics    Alcohol use: Yes     Comment: weekends    Drug use: No     Review of Systems   Constitutional: Negative for fever.   HENT: Negative for sore throat.    Respiratory: Negative for shortness of breath.    Cardiovascular: Negative for chest pain.   Gastrointestinal: Negative for nausea.   Genitourinary: Negative for dysuria.   Musculoskeletal: Negative for back pain.   Skin: Positive for wound. Negative for rash.   Neurological: Positive for headaches. Negative for weakness.   Hematological: Does not bruise/bleed easily.       Physical Exam     Initial Vitals [05/09/22 2054]   BP Pulse Resp Temp SpO2   135/89 65 18 97.3 °F (36.3 °C) 97 %      MAP       --         Physical Exam    Nursing note and vitals reviewed.  Constitutional: He appears well-developed and well-nourished. He is not diaphoretic. No distress.    HENT:   Head: Normocephalic and atraumatic.   Mouth/Throat: Oropharynx is clear and moist.   Eyes: EOM are normal. Pupils are equal, round, and reactive to light.   Neck: Neck supple.   Normal range of motion.  Cardiovascular: Normal rate.   Pulmonary/Chest: No respiratory distress.   Abdominal: He exhibits no distension.   Musculoskeletal:         General: Normal range of motion.      Cervical back: Normal range of motion and neck supple.      Comments: No midlineTTP     Neurological: He is alert and oriented to person, place, and time. He has normal strength. GCS score is 15. GCS eye subscore is 4. GCS verbal subscore is 5. GCS motor subscore is 6.   Skin: Skin is warm and dry. Capillary refill takes less than 2 seconds.   1.5 cm laceration with skin avulsion noted inferiorly   Bleeding controlled   No foreign body    Psychiatric: He has a normal mood and affect. His behavior is normal. Judgment and thought content normal.         ED Course   Lac Repair    Date/Time: 5/9/2022 11:50 PM  Performed by: Mary Jane Ye PA-C  Authorized by: Shanika Goodman MD     Consent:     Consent obtained:  Verbal    Consent given by:  Patient  Universal protocol:     Patient identity confirmed:  Verbally with patient  Anesthesia:     Anesthesia method:  Topical application    Topical anesthetic:  LET  Laceration details:     Location:  Scalp    Scalp location:  Frontal    Length (cm):  1.5  Exploration:     Limited defect created (wound extended): no      Imaging outcome: foreign body not noted      Wound exploration: wound explored through full range of motion and entire depth of wound visualized      Contaminated: no    Treatment:     Area cleansed with:  Saline    Amount of cleaning:  Standard    Irrigation solution:  Sterile saline    Irrigation volume:  1 l    Irrigation method:  Pressure wash    Visualized foreign bodies/material removed: no      Debridement:  None  Skin repair:     Repair method:  Staples     Number of staples:  2  Approximation:     Approximation:  Close  Repair type:     Repair type:  Simple  Post-procedure details:     Dressing:  Open (no dressing)    Procedure completion:  Tolerated well, no immediate complications      Labs Reviewed - No data to display       Imaging Results    None          Medications   LETS (LIDOcaine-TETRAcaine-EPINEPHrine) gel solution ( Topical (Top) Given 5/9/22 2341)   Tdap (BOOSTRIX) vaccine injection 0.5 mL (0.5 mLs Intramuscular Given 5/9/22 2342)     Medical Decision Making:   History:   Old Medical Records: I decided to obtain old medical records.  Initial Assessment:   Emergent evaluation of a 36-year-old male who presents to the emergency department with scalp laceration that occurred after striking his head on a branch while running.  He did fall to the ground.  He denies loss of consciousness.  No other reported injury.  Patient is afebrile, hemodynamically stable, nontoxic appearing.  Will irrigate, perform laceration repair, update tetanus.  Patient declines analgesia.  Differential Diagnosis:   Differential diagnosis includes but is not limited to scalp laceration, arterial bleed, skull fracture, SDH.   ED Management:  Per [place] CT head rule, no indication for imaging.   1.5 cm laceration with skin avulsion inferiorly noted to scalp.   2 staples place. Patient tolerated well with no immediate complication.   Wound care discussed. Advise f/u 7-10 days for staple removal.   Return precautions given. All questions answered.   The patient was instructed to follow up with a primary care provider/UC for suture removal or to return to the emergency department for worsening symptoms. The treatment plan was discussed with the patient who demonstrated understanding and comfort with plan.                       Clinical Impression:   Final diagnoses:  [S01.01XA] Scalp laceration, initial encounter (Primary)          ED Disposition Condition    Discharge Stable        ED  Prescriptions     None        Follow-up Information     Follow up With Specialties Details Why Contact Info Additional Information    [facility] Emergency Medicine Go to  If symptoms worsen [address]  [facility] [id number] [phone]     [facility] Schedule an appointment as soon as possible for a visit in 7 days For suture removal [address]  [facility] [id number] [phone] [facility] Please park in surface lot and check in at central registration desk           Mary Jane Ye PA-C  05/10/22 9655

## 2022-05-10 NOTE — DISCHARGE INSTRUCTIONS
Your laceration was repaired in the emergency department.  Please follow-up in 7-10 days for suture removal or return to the ER sooner for any new or worsening symptoms.     --You may cleanse daily by rinsing gently with soap and water.   --It is okay to shower. Do not soak the wound in water. This includes swimming pools, hot tubs.  --If your stitches or staples require removal, you may seek care at your primary care, urgent care, or the emergency room. [facility] will not charge you for an additional visit for stiches or staple removal regardless of your insurance status.   --Return to the emergency department if you develop fevers, spreading redness or streaking redness, severe pain, swelling, or leakage of pus from the wound.

## 2022-05-18 ENCOUNTER — OFFICE VISIT (OUTPATIENT)
Dept: FAMILY MEDICINE | Facility: CLINIC | Age: 37
End: 2022-05-18
Payer: COMMERCIAL

## 2022-05-18 VITALS
HEART RATE: 65 BPM | HEIGHT: 76 IN | SYSTOLIC BLOOD PRESSURE: 135 MMHG | WEIGHT: 191.63 LBS | OXYGEN SATURATION: 97 % | BODY MASS INDEX: 23.33 KG/M2 | DIASTOLIC BLOOD PRESSURE: 78 MMHG

## 2022-05-18 DIAGNOSIS — F51.04 PSYCHOPHYSIOLOGICAL INSOMNIA: Primary | ICD-10-CM

## 2022-05-18 DIAGNOSIS — Z48.02 VISIT FOR SUTURE REMOVAL: ICD-10-CM

## 2022-05-18 PROCEDURE — 3075F PR MOST RECENT SYSTOLIC BLOOD PRESS GE 130-139MM HG: ICD-10-PCS | Mod: CPTII,S$GLB,, | Performed by: FAMILY MEDICINE

## 2022-05-18 PROCEDURE — 1159F MED LIST DOCD IN RCRD: CPT | Mod: CPTII,S$GLB,, | Performed by: FAMILY MEDICINE

## 2022-05-18 PROCEDURE — 3078F PR MOST RECENT DIASTOLIC BLOOD PRESSURE < 80 MM HG: ICD-10-PCS | Mod: CPTII,S$GLB,, | Performed by: FAMILY MEDICINE

## 2022-05-18 PROCEDURE — 3008F BODY MASS INDEX DOCD: CPT | Mod: CPTII,S$GLB,, | Performed by: FAMILY MEDICINE

## 2022-05-18 PROCEDURE — 99999 PR PBB SHADOW E&M-EST. PATIENT-LVL III: ICD-10-PCS | Mod: PBBFAC,,, | Performed by: FAMILY MEDICINE

## 2022-05-18 PROCEDURE — 99999 PR PBB SHADOW E&M-EST. PATIENT-LVL III: CPT | Mod: PBBFAC,,, | Performed by: FAMILY MEDICINE

## 2022-05-18 PROCEDURE — 99024 POSTOP FOLLOW-UP VISIT: CPT | Mod: S$GLB,,, | Performed by: FAMILY MEDICINE

## 2022-05-18 PROCEDURE — 3075F SYST BP GE 130 - 139MM HG: CPT | Mod: CPTII,S$GLB,, | Performed by: FAMILY MEDICINE

## 2022-05-18 PROCEDURE — 3078F DIAST BP <80 MM HG: CPT | Mod: CPTII,S$GLB,, | Performed by: FAMILY MEDICINE

## 2022-05-18 PROCEDURE — 99024 PR POST-OP FOLLOW-UP VISIT: ICD-10-PCS | Mod: S$GLB,,, | Performed by: FAMILY MEDICINE

## 2022-05-18 PROCEDURE — 1159F PR MEDICATION LIST DOCUMENTED IN MEDICAL RECORD: ICD-10-PCS | Mod: CPTII,S$GLB,, | Performed by: FAMILY MEDICINE

## 2022-05-18 PROCEDURE — 3008F PR BODY MASS INDEX (BMI) DOCUMENTED: ICD-10-PCS | Mod: CPTII,S$GLB,, | Performed by: FAMILY MEDICINE

## 2022-05-18 RX ORDER — ALPRAZOLAM 0.25 MG/1
0.25 TABLET ORAL NIGHTLY PRN
Qty: 20 TABLET | Refills: 1 | Status: SHIPPED | OUTPATIENT
Start: 2022-05-18 | End: 2022-09-11 | Stop reason: SDUPTHER

## 2022-05-18 RX ORDER — ZOLPIDEM TARTRATE 5 MG/1
5 TABLET ORAL NIGHTLY PRN
Qty: 30 TABLET | Refills: 1 | Status: SHIPPED | OUTPATIENT
Start: 2022-05-18 | End: 2022-06-17

## 2022-05-18 NOTE — ASSESSMENT & PLAN NOTE
I briefly discussed sleep hygiene and other cognitive behavioral therapy techniques to help work on insomnia but suggested that sleep anxiety may be more of the issue

## 2022-05-18 NOTE — PROGRESS NOTES
"Subjective:       Patient ID: Demond Hassan is a 36 y.o. male.    Chief Complaint: Suture / Staple Removal    HPI  Came in today for staple removal.  Had 2 staples placed in his scalp about 8 days ago.  Has had no issues since that time.    Also has been having issues with chronic insomnia.  Usually does not have too much trouble falling asleep however occasionally he will but more often he will wake up early or in the middle of the night and have a hard time falling back asleep.  He has noticed that this is more common whenever he has something be coming up next day.  Has noticed that Ambien helps but sometimes in certain situations the alprazolam will help even better.  I briefly discussed sleep hygiene and other cognitive behavioral therapy techniques to help work on insomnia but suggested that sleep anxiety may be more of the issue    He had some questions about his blood pressure with systolic reading being in the 130s.  I noted that this is considered pre hypertension but is something we should keep an eye on since he is already exercising routinely.  Admits being able to improve his diet somewhat.    Family History   Problem Relation Age of Onset    Hypertension Maternal Grandmother        Current Outpatient Medications:     ALPRAZolam (XANAX) 0.25 MG tablet, Take 1 tablet (0.25 mg total) by mouth nightly as needed for Insomnia., Disp: 20 tablet, Rfl: 1    zolpidem (AMBIEN) 5 MG Tab, Take 1 tablet (5 mg total) by mouth nightly as needed (insomnia)., Disp: 30 tablet, Rfl: 1    Review of Systems   Constitutional: Negative for chills and fever.   Eyes: Negative for visual disturbance.   Respiratory: Negative for cough and shortness of breath.    Cardiovascular: Negative for chest pain.   Gastrointestinal: Negative for abdominal pain.   Neurological: Negative for dizziness.   Psychiatric/Behavioral: Positive for sleep disturbance.       Objective:   /78   Pulse 65   Ht 6' 4" (1.93 m)   Wt 86.9 kg (191 " lb 9.6 oz)   SpO2 97%   BMI 23.32 kg/m²      Physical Exam  Vitals reviewed.   Constitutional:       Appearance: He is well-developed.   HENT:      Head: Normocephalic and atraumatic.   Eyes:      Conjunctiva/sclera: Conjunctivae normal.   Cardiovascular:      Rate and Rhythm: Normal rate.   Pulmonary:      Effort: Pulmonary effort is normal. No respiratory distress.   Skin:     General: Skin is warm and dry.      Findings: No rash.   Neurological:      Mental Status: He is alert and oriented to person, place, and time.      Coordination: Coordination normal.   Psychiatric:         Behavior: Behavior normal.       procedure note:  Identified two staples in his frontal scalp.  They were removed without any issues and there was no bleeding afterward.  Patient tolerated the procedure well.    Assessment & Plan     Problem List Items Addressed This Visit          Other    Psychophysiological insomnia - Primary    Current Assessment & Plan      I briefly discussed sleep hygiene and other cognitive behavioral therapy techniques to help work on insomnia but suggested that sleep anxiety may be more of the issue             Relevant Medications    ALPRAZolam (XANAX) 0.25 MG tablet    zolpidem (AMBIEN) 5 MG Tab          Other Visit Diagnoses       Visit for suture removal              Recommended making appointment for routine annual exam.    Immunizations Administered on Date of Encounter - 5/18/2022       No immunizations on file.           Made change to billing code as patient was under the impression that suture removal visit would be included in global charge for sutures from the ED.   He had already been treated for insomnia in the past by other PCP and I only refilled meds he had been on in the past thus opted to drop the E&M code for insomnia.       No follow-ups on file.    Disclaimer:  This note may have been prepared using voice recognition software, it may have not been extensively proofed, as such there could  be errors within the text such as sound alike errors.

## 2022-05-18 NOTE — PATIENT INSTRUCTIONS
"The heart-healthy Mediterranean diet is a healthy eating plan based on typical foods and recipes of Mediterranean-style cooking. Here's how to adopt the Mediterranean diet.    By [facility] Staff  If you're looking for a heart-healthy eating plan, the Mediterranean diet might be right for you.    The Mediterranean diet incorporates the basics of healthy eating -- plus a splash of flavorful olive oil and perhaps a glass of red wine -- among other components characterizing the traditional cooking style of countries bordering the [place].    Most healthy diets include fruits, vegetables, fish and whole grains, and limit unhealthy fats. While these parts of a healthy diet are tried-and-true, subtle variations or differences in proportions of certain foods may make a difference in your risk of heart disease.    Research has shown that the traditional Mediterranean diet reduces the risk of heart disease. The diet has been associated with a lower level of oxidized low-density lipoprotein (LDL) cholesterol -- the "bad" cholesterol that's more likely to build up deposits in your arteries.    In fact, a meta-analysis of more than 1.5 million healthy adults demonstrated that following a Mediterranean diet was associated with a reduced risk of cardiovascular mortality as well as overall mortality.    The Mediterranean diet is also associated with a reduced incidence of cancer, and Parkinson's and Alzheimer's diseases. Women who eat a Mediterranean diet supplemented with extra-virgin olive oil and mixed nuts may have a reduced risk of breast cancer.    For these reasons, most if not all major scientific organizations encourage healthy adults to adapt a style of eating like that of the Mediterranean diet for prevention of major chronic diseases.    The Mediterranean diet emphasizes:    Eating primarily plant-based foods, such as fruits and vegetables, whole grains, legumes and nuts  Replacing butter with healthy fats " "such as olive oil and canola oil  Using herbs and spices instead of salt to flavor foods  Limiting red meat to no more than a few times a month  Eating fish and poultry at least twice a week  Enjoying meals with family and friends  Drinking red wine in moderation (optional)  Getting plenty of exercise  The Mediterranean diet traditionally includes fruits, vegetables, pasta and rice. For example, residents of [facility] eat very little red meat and average nine servings a day of antioxidant-rich fruits and vegetables.    Grains in the Mediterranean region are typically whole grain and usually contain very few unhealthy trans fats, and bread is an important part of the diet there. However, throughout the Mediterranean region, bread is eaten plain or dipped in olive oil -- not eaten with butter or margarines, which contain saturated or trans fats.    Nuts are another part of a healthy Mediterranean diet. Nuts are high in fat (approximately 80 percent of their calories come from fat), but most of the fat is not saturated. Because nuts are high in calories, they should not be eaten in large amounts -- generally no more than a handful a day. Avoid candied or honey-roasted and heavily salted nuts.    The focus of the Mediterranean diet isn't on limiting total fat consumption, but rather to make layton choices about the types of fat you eat. The Mediterranean diet discourages saturated fats and hydrogenated oils (trans fats), both of which contribute to heart disease.    The Mediterranean diet features olive oil as the primary source of fat. Olive oil provides monounsaturated fat -- a type of fat that can help reduce LDL cholesterol levels when used in place of saturated or trans fats.    "Extra-virgin" and "virgin" olive oils -- the least processed forms -- also contain the highest levels of the protective plant compounds that provide antioxidant effects.    Monounsaturated fats and polyunsaturated fats, such as canola oil and " some nuts, contain the beneficial linolenic acid (a type of omega-3 fatty acid). Omega-3 fatty acids lower triglycerides, decrease blood clotting, are associated with decreased sudden heart attack, improve the health of your blood vessels, and help moderate blood pressure.    Fatty fish -- such as mackerel, lake trout, herring, sardines, albacore tuna and salmon -- are rich sources of omega-3 fatty acids. Fish is eaten on a regular basis in the Mediterranean diet.    The health effects of alcohol have been debated for many years, and some doctors are reluctant to encourage alcohol consumption because of the health consequences of excessive drinking.    However, alcohol -- in moderation -- has been associated with a reduced risk of heart disease in some research studies.    The Mediterranean diet typically includes a moderate amount of wine. This means no more than 5 ounces (148 milliliters) of wine daily for women (or men over age 65), and no more than 10 ounces (296 milliliters) of wine daily for men under age 65.    If you're unable to limit your alcohol intake to the amounts defined above, if you have a personal or family history of alcohol abuse, or if you have heart or liver disease, refrain from drinking wine or any other alcohol.    The Mediterranean diet is a delicious and healthy way to eat. Many people who switch to this style of eating say they'll never eat any other way. Here are some specific steps to get you started:    Eat your veggies and fruits -- and switch to whole grains. An abundance and variety of plant foods should make up the majority of your meals. Strive for seven to 10 servings a day of veggies and fruits. Switch to whole-grain bread and cereal, and begin to eat more whole-gain rice and pasta products.  Go nuts. Keep almonds, cashews, pistachios and walnuts on hand for a quick snack. Choose natural peanut butter, rather than the kind with hydrogenated fat added. Try tahini (blended sesame  seeds) as a dip or spread for bread.  Pass on the butter. Try olive or canola oil as a healthy replacement for butter or margarine. Use it in cooking. Dip bread in flavored olive oil or lightly spread it on whole-grain bread for a tasty alternative to butter. Or try tahini as a dip or spread.  Spice it up. Herbs and spices make food tasty and are also rich in health-promoting substances. Season your meals with herbs and spices rather than salt.  Go fish. Eat fish once or twice a week. Fresh or water-packed tuna, salmon, trout, mackerel and herring are healthy choices. Grilled fish tastes good and requires little cleanup. Avoid fried fish, unless it's sauteed in a small amount of canola oil.  Rein in the red meat. Substitute fish and poultry for red meat. When eaten, make sure it's lean and keep portions small (about the size of a deck of cards). Also avoid sausage, segundo and other high-fat meats.  Choose low-fat dairy. Limit higher fat dairy products such as whole or 2 percent milk, cheese and ice cream. Switch to skim milk, fat-free yogurt and low-fat cheese.  Raise a glass to healthy eating. If it's OK with your doctor, have a glass of wine at dinner. If you don't drink alcohol, you don't need to start. Drinking purple grape juice may be an alternative to wine.  References    The traditional Mediterranean diet. [facility]. http://www.[facility].org/traditional-mediterranean-diet. Accessed Feb. 8, 2016.  Tia F, et al. Adherence to Mediterranean diet and health status: [place]-analysis. BMJ. 2008;337:a1344.  Cheo PN, et al. Mediterranean dietary pattern and prediction of all-cause mortality in a [place] population. Archives of Internal Medicine. 2007;167:2461.  Monounsaturated fats. American Heart Association. http://www.heart.org/HEARTORG/HealthyLiving/HealthyEating/Nutrition/Monounsaturated-Fats_[facility]_301460_Article.[email or web address]. Accessed Feb. 8, 2016.  José Miguel CORLEY (expert opinion). [facility],  [facility]. Feb. 29, 2016.  Mediterranean diet pyramid. [facility]. http://[facility].org/resources/heritage-pyramids/mediterranean-pyramid/overview. Accessed Feb. 8, 2016.  AHA Scientific Statement: Fish consumption, fish oil, omega-3 fatty acids, and cardiovascular disease. Circulation. 2002;106:2747.  AHA Scientific Statement: Diet and lifestyle recommendations revision 2006. Circulation 2006;114:82.  0896-6840 Dietary Guidelines for Americans. U.S. Department of Health and Human Services and U.S. Department of Agriculture. http://health.gov/dietaryguidelines/2015/guidelines. Accessed Feb. 8, 2016.  Sujata ZHOU. Healthy diet in adults. http://www.[employer]/home. Accessed Feb. 8, 2016.  Carlton GOYAL et al. Mediterranean diet and invasive breast cancer risk among women at high cardiovascular risk in the PREDIMED trial: A randomized clinical trial. [facility]. 2015;175:1752.  Greta ROSARIO, et al. Mediterranean diet and telomere length in Nurses' Health Study: Population based cohort study. Mediterranean diet and telomere length in Nurses' Health Study: Population based cohort study. BMJ. 2014;349:g6674.  May 03, 2016  Original article: http://www.[facility].org/healthy-lifestyle/nutrition-and-healthy-eating/in-depth/mediterranean-diet/art-11059148

## 2022-08-19 ENCOUNTER — TELEPHONE (OUTPATIENT)
Dept: FAMILY MEDICINE | Facility: CLINIC | Age: 37
End: 2022-08-19
Payer: COMMERCIAL

## 2022-08-24 NOTE — TELEPHONE ENCOUNTER
I apologize for the misunderstanding. The staple removal usually is included with the charges from the ED however since we addressed other concerns of insomnia and I prescribed medication for that issue, there had to be separate billing code for that.

## 2022-09-11 DIAGNOSIS — F51.04 PSYCHOPHYSIOLOGICAL INSOMNIA: ICD-10-CM

## 2022-09-11 NOTE — TELEPHONE ENCOUNTER
No new care gaps identified.  [facility] Embedded Care Gaps. Reference number: [id number]. 9/11/2022   1:42:49 PM CDT

## 2022-09-12 ENCOUNTER — TELEPHONE (OUTPATIENT)
Dept: FAMILY MEDICINE | Facility: CLINIC | Age: 37
End: 2022-09-12
Payer: COMMERCIAL

## 2022-09-12 RX ORDER — ALPRAZOLAM 0.25 MG/1
0.25 TABLET ORAL NIGHTLY PRN
Qty: 20 TABLET | Refills: 1 | Status: SHIPPED | OUTPATIENT
Start: 2022-09-12

## 2022-09-12 NOTE — TELEPHONE ENCOUNTER
----- Message from Dee Uriah sent at 9/12/2022 11:58 AM CDT -----  Regarding: bill codes concerns  Name of Who is Calling: Demond           What is the request in detail: Patient is requesting a call back in regards to his medical bill that was suppose to be look at to make sure the correct codes were used.            Can the clinic reply by [facility]: Yes           What Number to Call Back if not in [facility]: [phone]

## 2022-09-12 NOTE — TELEPHONE ENCOUNTER
----- Message from Aston Ibarra sent at 9/12/2022  3:18 PM CDT -----  Type:  Patient Returning Call    Who Called:     Who Left Message for Patient:     Does the patient know what this is regarding?: missed call     Best Call Back Number:  [phone]    Additional Information:

## 2022-11-04 ENCOUNTER — PATIENT OUTREACH (OUTPATIENT)
Dept: ADMINISTRATIVE | Facility: HOSPITAL | Age: 37
End: 2022-11-04
Payer: COMMERCIAL

## 2023-03-28 NOTE — TELEPHONE ENCOUNTER
----- Message from Tatum Gonzalez sent at 8/19/2022 10:54 AM CDT -----  Regarding: Requesting a call back  Contact: KIM JOAQUIN [[id number]]  Name of Who is Calling:KIM JOAQUIN [[id number]]          What is the request in detail:  Pt states he is requesting a call back in regards to his previous appt, he states it was suppose to be free but he was billed, he states he talked to billing and they advised he should reach out to staff. Please advise         Can the clinic reply by [facility]: No           What Number to Call Back if not in [facility]:[phone]     Valtrex Counseling: I discussed with the patient the risks of valacyclovir including but not limited to kidney damage, nausea, vomiting and severe allergy.  The patient understands that if the infection seems to be worsening or is not improving, they are to call.

## 2024-11-19 ENCOUNTER — OFFICE VISIT (OUTPATIENT)
Dept: URGENT CARE | Facility: CLINIC | Age: 39
End: 2024-11-19
Payer: COMMERCIAL

## 2024-11-19 VITALS
TEMPERATURE: 98 F | OXYGEN SATURATION: 97 % | SYSTOLIC BLOOD PRESSURE: 144 MMHG | WEIGHT: 191 LBS | BODY MASS INDEX: 23.26 KG/M2 | RESPIRATION RATE: 17 BRPM | HEIGHT: 76 IN | HEART RATE: 72 BPM | DIASTOLIC BLOOD PRESSURE: 91 MMHG

## 2024-11-19 DIAGNOSIS — S01.01XA LACERATION OF SKIN OF SCALP, INITIAL ENCOUNTER: Primary | ICD-10-CM

## 2024-11-19 RX ORDER — FLUTICASONE FUROATE, UMECLIDINIUM BROMIDE AND VILANTEROL TRIFENATATE 100; 62.5; 25 UG/1; UG/1; UG/1
POWDER RESPIRATORY (INHALATION)
COMMUNITY

## 2024-11-19 RX ORDER — ZOLPIDEM TARTRATE 12.5 MG/1
1 TABLET, FILM COATED, EXTENDED RELEASE ORAL NIGHTLY PRN
COMMUNITY
Start: 2023-12-27

## 2024-11-19 RX ORDER — MUPIROCIN 20 MG/G
OINTMENT TOPICAL
Qty: 22 G | Refills: 1 | Status: SHIPPED | OUTPATIENT
Start: 2024-11-19

## 2024-11-19 NOTE — PROCEDURES
"Laceration Repair    Date/Time: 11/19/2024 3:45 PM    Performed by: Milan Crabtree MD  Authorized by: Milan Crabtree MD  Consent Done: Yes  Consent: Verbal consent obtained.  Risks and benefits: risks, benefits and alternatives were discussed  Consent given by: patient  Patient understanding: patient states understanding of the procedure being performed  Patient consent: the patient's understanding of the procedure matches consent given  Procedure consent: procedure consent matches procedure scheduled  Relevant documents: relevant documents present and verified  Patient identity confirmed: verbally with patient and name  Time out: Immediately prior to procedure a "time out" was called to verify the correct patient, procedure, equipment, support staff and site/side marked as required.  Body area: head/neck  Location details: scalp  Laceration length: 1.5 cm  Foreign bodies: no foreign bodies  Tendon involvement: complex  Nerve involvement: complex  Vascular damage: no    Patient sedated: no  Preparation: Patient was prepped and draped in the usual sterile fashion.  Irrigation solution: saline  Irrigation method: syringe  Amount of cleaning: standard  Debridement: none  Degree of undermining: none  Skin closure: staples  Number of sutures: 2  Approximation: close  Approximation difficulty: simple  Dressing: open (no dressing)  Patient tolerance: Patient tolerated the procedure well with no immediate complications        "

## 2024-11-19 NOTE — PROGRESS NOTES
Subjective:      Patient ID: Demond Hassan is a 39 y.o. male.    Vitals:  vitals were not taken for this visit.     Chief Complaint: No chief complaint on file.    This is a 39 y.o. male who presents today with a chief complaint of laceration on head     Laceration     Skin:  Positive for laceration.    Objective:     Physical Exam    Assessment:     No diagnosis found.    Plan:       There are no diagnoses linked to this encounter.

## 2024-11-19 NOTE — PROGRESS NOTES
"Subjective:      Patient ID: Demond Hassan is a 39 y.o. male.    Vitals:  height is 6' 4" (1.93 m) and weight is 86.6 kg (191 lb). His oral temperature is 98.4 °F (36.9 °C). His blood pressure is 144/91 (abnormal) and his pulse is 72. His respiration is 17 and oxygen saturation is 97%.     Chief Complaint: Laceration    This is a 39 y.o. male who presents today with a chief complaint of   Pt hit his head on a locker door and cut his head about 30 min ago.     Laceration   The incident occurred less than 1 hour ago. The laceration is located on the Scalp. The laceration mechanism was a metal edge. The pain is at a severity of 3/10. The pain is moderate. The pain has been Constant since onset. He reports no foreign bodies present. His tetanus status is UTD.       Skin:  Positive for laceration.      Objective:     Physical Exam   Constitutional: He does not appear ill. No distress. normal  HENT:   Head: Normocephalic.   Cardiovascular: Normal rate, regular rhythm, normal heart sounds and normal pulses.   Pulmonary/Chest: Effort normal and breath sounds normal.   Abdominal: Normal appearance.   Neurological: He is alert.   Skin: lesion (1.5 cm "V" shaped laceration crown of scalp, hemostatic)   Nursing note and vitals reviewed.    Assessment:     1. Laceration of skin of scalp, initial encounter        Plan:       Laceration of skin of scalp, initial encounter  -     mupirocin (BACTROBAN) 2 % ointment; Apply to affected area 3 times daily  Dispense: 22 g; Refill: 1  -     Laceration Repair    Discussed signs and symptoms of infection and wound care. RTC in 7 days for staples to be removed                "

## 2024-11-26 ENCOUNTER — TELEPHONE (OUTPATIENT)
Dept: INTERNAL MEDICINE | Facility: CLINIC | Age: 39
End: 2024-11-26
Payer: COMMERCIAL

## 2024-11-26 NOTE — TELEPHONE ENCOUNTER
Yes. Just find out from patient when he was supposed to have them removed and get him scheduled with whomever is available.

## 2024-11-26 NOTE — TELEPHONE ENCOUNTER
----- Message from Vy sent at 11/26/2024  3:05 PM CST -----  Regarding: Question  Name of Who is Calling:  Patient          What is the request in detail:  Patient stated he had staples put in his head and would like to know if Dr. Dubois can take them out            Can the clinic reply by [facility]: No            What Number to Call Back if not in [facility]: [phone]

## 2024-12-03 ENCOUNTER — OFFICE VISIT (OUTPATIENT)
Dept: URGENT CARE | Facility: CLINIC | Age: 39
End: 2024-12-03
Payer: COMMERCIAL

## 2024-12-03 VITALS
TEMPERATURE: 99 F | SYSTOLIC BLOOD PRESSURE: 128 MMHG | HEIGHT: 76 IN | DIASTOLIC BLOOD PRESSURE: 83 MMHG | WEIGHT: 191 LBS | OXYGEN SATURATION: 98 % | HEART RATE: 70 BPM | BODY MASS INDEX: 23.26 KG/M2 | RESPIRATION RATE: 18 BRPM

## 2024-12-03 DIAGNOSIS — Z48.02 ENCOUNTER FOR STAPLE REMOVAL: Primary | ICD-10-CM

## 2024-12-03 NOTE — PROCEDURES
Suture Removal    Date/Time: 12/3/2024 3:30 PM  Location procedure was performed: [facility]    Performed by: Alfredo Mcqueen PA-C  Authorized by: Alfredo Mcqueen PA-C  Body area: head/neck  Location details: scalp  Wound Appearance: clean, well healed and nontender  Sutures Removed: 0  Staples Removed: 2  Post-removal: no dressing applied  Facility: sutures placed in this facility  Patient tolerance: Patient tolerated the procedure well with no immediate complications

## 2024-12-03 NOTE — PATIENT INSTRUCTIONS
- watch for signs of infection including increased redness, swelling, discharge, increased pain, red streaking, fever.  Seek medical attention immediately if these occur.      - Follow up with your PCP or specialty clinic as directed in the next 1-2 weeks if not improved or as needed.  You can call [phone] to schedule an appointment with the appropriate provider.    - Go to the ER or seek medical attention immediately if you develop new or worsening symptoms.     - You must understand that you have received an Urgent Care treatment only and that you may be released before all of your medical problems are known or treated.   - You, the patient, will arrange for follow up care as instructed.   - If your condition worsens or fails to improve we recommend that you receive another evaluation at the ER immediately or contact your PCP to discuss your concerns or return here.

## 2024-12-03 NOTE — PROGRESS NOTES
"Subjective:      Patient ID: Demond Hassan is a 39 y.o. male.    Vitals:  height is 6' 4" (1.93 m) and weight is 86.6 kg (191 lb). His temperature is 98.5 °F (36.9 °C). His blood pressure is 128/83 and his pulse is 70. His respiration is 18 and oxygen saturation is 98%.     Chief Complaint: Suture / Staple Removal    .This is a 39 y.o. male who presents for staple removal (in head) place 11/19/2024. No complaints.     Suture / Staple Removal  The sutures were placed 11 to 14 days ago. He tried nothing since the wound repair. There has been no drainage from the wound. There is no redness present. There is no swelling present. There is no pain present. He has no difficulty moving the affected extremity or digit.       Constitution: Negative for chills, sweating, fatigue and fever.   HENT:  Negative for ear pain, congestion and sore throat.    Neck: Negative for neck pain and neck stiffness.   Cardiovascular:  Negative for chest pain, leg swelling, palpitations and sob on exertion.   Eyes:  Negative for eye itching, eye pain and eye redness.   Respiratory:  Negative for cough, sputum production and shortness of breath.    Gastrointestinal:  Negative for abdominal pain, nausea, vomiting and diarrhea.   Genitourinary:  Negative for dysuria, frequency, urgency, flank pain and hematuria.   Musculoskeletal:  Negative for pain and joint pain.   Skin:  Positive for laceration. Negative for color change and rash.   Neurological:  Negative for dizziness, passing out, headaches, disorientation and numbness.   Psychiatric/Behavioral:  Negative for disorientation.       Objective:     Physical Exam   Constitutional: He is oriented to person, place, and time. He appears well-developed. No distress.   HENT:   Head: Normocephalic and atraumatic.   Ears:   Right Ear: External ear normal.   Left Ear: External ear normal.   Top of scalp laceration is well healed.  Two staples in place removed without complication.  No evidence of " infection.      Comments: Top of scalp laceration is well healed.  Two staples in place removed without complication.  No evidence of infection.  Eyes: Conjunctivae are normal. Right eye exhibits no discharge. Left eye exhibits no discharge. No scleral icterus.   Pulmonary/Chest: Effort normal. No stridor. No respiratory distress. He has no wheezes.   Neurological: He is alert and oriented to person, place, and time.   Skin: Skin is not diaphoretic.   Psychiatric: His behavior is normal. Judgment and thought content normal.   Nursing note and vitals reviewed.      Assessment:     1. Encounter for staple removal        Plan:       Encounter for staple removal  -     Suture Removal

## 2025-02-12 ENCOUNTER — PATIENT OUTREACH (OUTPATIENT)
Dept: ADMINISTRATIVE | Facility: HOSPITAL | Age: 40
End: 2025-02-12
Payer: COMMERCIAL

## 2025-02-24 ENCOUNTER — TELEPHONE (OUTPATIENT)
Dept: PODIATRY | Facility: CLINIC | Age: 40
End: 2025-02-24
Payer: COMMERCIAL

## 2025-02-24 NOTE — TELEPHONE ENCOUNTER
Left vm for pt with callback number of [phone] in regards to r/s 2/24 appt due to provider being out of clinic. Sent portal message.

## 2025-05-01 ENCOUNTER — OFFICE VISIT (OUTPATIENT)
Dept: PAIN MEDICINE | Facility: CLINIC | Age: 40
End: 2025-05-01
Payer: COMMERCIAL

## 2025-05-01 ENCOUNTER — TELEPHONE (OUTPATIENT)
Dept: SPORTS MEDICINE | Facility: CLINIC | Age: 40
End: 2025-05-01
Payer: COMMERCIAL

## 2025-05-01 VITALS
SYSTOLIC BLOOD PRESSURE: 136 MMHG | DIASTOLIC BLOOD PRESSURE: 84 MMHG | HEIGHT: 76 IN | BODY MASS INDEX: 21.72 KG/M2 | HEART RATE: 54 BPM | WEIGHT: 178.38 LBS

## 2025-05-01 DIAGNOSIS — M54.16 LUMBAR RADICULOPATHY, CHRONIC: Primary | ICD-10-CM

## 2025-05-01 DIAGNOSIS — G57.00 PIRIFORMIS SYNDROME, UNSPECIFIED LATERALITY: ICD-10-CM

## 2025-05-01 PROCEDURE — 99999 PR PBB SHADOW E&M-EST. PATIENT-LVL III: CPT | Mod: PBBFAC,,, | Performed by: STUDENT IN AN ORGANIZED HEALTH CARE EDUCATION/TRAINING PROGRAM

## 2025-05-01 RX ORDER — PREGABALIN 50 MG/1
50 CAPSULE ORAL 3 TIMES DAILY
Qty: 90 CAPSULE | Refills: 0 | Status: SHIPPED | OUTPATIENT
Start: 2025-05-01 | End: 2025-05-31

## 2025-05-01 NOTE — TELEPHONE ENCOUNTER
Called and left a message asking the pt to call back and let us know the exact location of his pain

## 2025-05-01 NOTE — PROGRESS NOTES
[facility] - New Patient Evaluation    Referred by: No ref. provider found   Reason for referral: Lumbar radiculopathy, chronic     CC:   Chief Complaint   Patient presents with    Tailbone Pain         5/1/2025     1:41 PM   Last 3 PDI Scores   Pain Disability Index (PDI) 21       Subjective 05/01/2025:   Demond Hassan is a 39 y.o. male who presents complaining of right buttocks pain that radiates down the right lower extremity to the lateral calf.  Pain has been intermittent over the last year.  Pain would typically resolve on its own, however has been more persistent over the last few weeks.  Patient notices the pain with certain movements including playing tennis and leaning on the right leg.  No history of spine surgeries.  No history of physical therapy.  No previous injections.  Patient is very active playing tennis and running.    Initial Pain Assessment:  Location: right buttocks    Onset (Approx Date Pain started): intermittent x 1 year, and more persistent over the last few weeks  Current Pain Score: 3/10  Daily Pain of Range: 0-8/10  Quality: Sharp, Shooting, and muscle stretching   Radiation: right leg to the lateral calf.  Worsened by:  certain movements   Improved by: medications     Patient denies urinary incontinence, bowel incontinence, significant weight loss, significant motor weakness, and loss of sensations.      Previous Interventions:  - None    Previous Therapies:  PT/OT: no   Chiropractor:   HEP: yes, including running and tennis  Relevant Surgery: no     Previous Medications:   - Tylenol or NSAIDS: Ibuprofen   - Muscle Relaxants:    - TCAs:   - SNRIs:   - Topicals:   - Anticonvulsants:    - Opioids:   - Adjuvants:     Current Pain Medications:   Ibuprofen PRN    Anticoagulation: no     Review of Systems:  ROS    GENERAL:  No weight loss, malaise or fevers. NO   HEENT:   No recent changes in vision or hearing NO   NECK:  No difficulty with swallowing. No  "stridor. NO   RESPIRATORY:  Negative for cough, wheezing or shortness of breath, patient denies any recent URI. NO   CARDIOVASCULAR:  Negative for chest pain, leg swelling or palpitations. NO   GI:  Negative for abdominal discomfort, blood in stools or black stools or change in bowel habits. NO   MUSCULOSKELETAL:  See HPI.  SKIN:  Negative for lesions, rash, and itching. NO   PSYCH:  No mood disorder or recent psychosocial stressors.  NO   HEMATOLOGY/LYMPHOLOGY:  Negative for prolonged bleeding, bruising easily or swollen nodes.  Patient is not currently taking any anti-coagulants NO   NEURO:   No history of headaches, syncope, paralysis, seizures or tremors. NO   All other reviewed and negative other than HPI.    History:  Current medications, allergies, medical history, surgical history,   family history, and social history were reviewed in the chart as marked.    Full Medication List:  Current Medications[1]     Allergies:  Patient has no known allergies.     Medical History:   has a past medical history of Known health problems: none.    Surgical History:   has a past surgical history that includes No past surgeries.    Family History:  family history includes Hypertension in his maternal grandmother.    Social History:   reports that he has never smoked. He has never used smokeless tobacco. He reports current alcohol use of about 5.0 standard drinks of alcohol per week. He reports that he does not use drugs.    Physical Exam:  Vitals:    05/01/25 1342   BP: 136/84   Pulse: (!) 54   Weight: 80.9 kg (178 lb 5.6 oz)   Height: 6' 4" (1.93 m)   PainSc:   3       GENERAL: Well appearing, in no acute distress, alert and oriented x3.  PSYCH:  Mood and affect appropriate.  SKIN: Skin color, texture, turgor normal, no rashes or lesions.  HEAD/FACE:  Normocephalic, atraumatic. Cranial nerves grossly intact.  NECK: Normal ROM. Supple.   CV: RRR with palpation of the radial artery.  PULM: No evidence of respiratory " difficulty, symmetric chest rise.  GI:  Soft and non-distended.  MSK: Straight leg raising is negative to radicular pain. + piriformis stretches positive.  No pain to palpation over the facet joints of the lumbar spine. No pain with lumbar facet loading. No pain over the SI joints. DAMI test is negative. No pain over the GTB bilaterally.  Normal range of motion of the lumbar spine.  Milgrams negative.  Peripheral joint ROM is full and pain free without obvious instability or laxity in all four extremities. No obvious deformities, edema, or skin discoloration.  No atrophy or tone abnormalities are noted.   NEURO: Bilateral upper and lower extremity coordination and strength is symmetric.  No loss of sensation is noted. No clonus.   MENTAL STATUS: A x O x 3, good concentration, speech is fluent and goal directed  MOTOR: 5/5 in all  muscle groups  GAIT: Normal.  Ambulates unassisted.    Imaging:  No recent pertinent imaging available.    Labs:  BMP  Lab Results   Component Value Date     07/27/2006    K 3.9 07/27/2006     07/27/2006    CO2 25 07/27/2006    BUN 13 07/27/2006    CREATININE 1.0 07/27/2006    CALCIUM 10.1 07/27/2006     Lab Results   Component Value Date    ALT 21 07/27/2006    AST 18 07/27/2006    ALKPHOS 67 07/27/2006    BILITOT 0.5 07/27/2006     Lab Results   Component Value Date    WBC 5.57 07/27/2006    HGB 15.6 07/27/2006    HCT 45.9 07/27/2006    MCV 84.1 07/27/2006     07/27/2006           Assessment:  Problem List Items Addressed This Visit    None  Visit Diagnoses         Lumbar radiculopathy, chronic    -  Primary    Relevant Medications    pregabalin (LYRICA) 50 MG capsule    Other Relevant Orders    MRI Lumbar Spine Without Contrast    Ambulatory Referral/Consult to Physical Therapy      Piriformis syndrome, unspecified laterality        Relevant Medications    pregabalin (LYRICA) 50 MG capsule    Other Relevant Orders    Ambulatory Referral/Consult to Physical Therapy             2025 - Demond Hassan is a 39 y.o. male who  has a past medical history of Known health problems: none.  By history and examination this patient has chronic low back pain with radiculopathy on the right, possibly piriformis syndrome as well. We discussed the underlying diagnoses and multiple treatment options including non-opioid medications, interventional procedures, and physical therapy.  Referral to physical therapy.  MRI ordered of the lumbar spine.  Discuss procedural options including epidural +/- piriformis injection.  Trial of Lyrica.  The risks and benefits of each treatment option were discussed and all questions were answered.      Treatment Plan:   Procedures:  May benefit from epidural steroid injection +/- piriformis injections.  PT/OT/HEP: I have stressed the importance of physical activity and a home exercise plan to help with pain and improve health.  Referral placed to physical therapy.  Patient may benefit from healthy back referral.  Medications:    - may continue with ibuprofen PRN  - trial of Lyrica 50 mg t.i.d..  Can start with q.h.s. and increase to t.i.d. if tolerated.   -  Reviewed and consistent with medication use as prescribed.  Imaging:  Lumbar MRI ordered.  Follow Up:  Virtual visit after MRI to review imaging results as well as to discuss response to Lyrica, physical therapy, and procedural options.    Vicenta Thapa, DO   Interventional Pain Medicine / Anesthesiology    Disclaimer: This note was partly generated using dictation software which may occasionally result in transcription errors.         [1]   Current Outpatient Medications:     ALPRAZolam (XANAX) 0.25 MG tablet, Take 1 tablet (0.25 mg total) by mouth nightly as needed for Insomnia., Disp: 20 tablet, Rfl: 1    mupirocin (BACTROBAN) 2 % ointment, Apply to affected area 3 times daily, Disp: 22 g, Rfl: 1    TRELEGY ELLIPTA 100-62.5-25 mcg DsDv, SMARTSI Inhalation By Mouth Daily, Disp: , Rfl:      zolpidem (AMBIEN CR) 12.5 MG CR tablet, Take 1 tablet by mouth nightly as needed., Disp: , Rfl:     pregabalin (LYRICA) 50 MG capsule, Take 1 capsule (50 mg total) by mouth 3 (three) times daily., Disp: 90 capsule, Rfl: 0

## 2025-05-14 ENCOUNTER — HOSPITAL ENCOUNTER (OUTPATIENT)
Dept: RADIOLOGY | Facility: HOSPITAL | Age: 40
Discharge: HOME | End: 2025-05-14
Attending: STUDENT IN AN ORGANIZED HEALTH CARE EDUCATION/TRAINING PROGRAM
Payer: COMMERCIAL

## 2025-05-14 DIAGNOSIS — M54.16 LUMBAR RADICULOPATHY, CHRONIC: ICD-10-CM

## 2025-05-14 PROCEDURE — 72148 MRI LUMBAR SPINE W/O DYE: CPT | Mod: TC

## 2025-05-14 PROCEDURE — 72148 MRI LUMBAR SPINE W/O DYE: CPT | Mod: 26,,, | Performed by: RADIOLOGY

## 2025-05-15 ENCOUNTER — CLINICAL SUPPORT (OUTPATIENT)
Dept: REHABILITATION | Facility: HOSPITAL | Age: 40
End: 2025-05-15
Attending: STUDENT IN AN ORGANIZED HEALTH CARE EDUCATION/TRAINING PROGRAM
Payer: COMMERCIAL

## 2025-05-15 DIAGNOSIS — G57.00 PIRIFORMIS SYNDROME, UNSPECIFIED LATERALITY: ICD-10-CM

## 2025-05-15 DIAGNOSIS — R52 PAIN: Primary | Chronic | ICD-10-CM

## 2025-05-15 DIAGNOSIS — M54.16 LUMBAR RADICULOPATHY, CHRONIC: ICD-10-CM

## 2025-05-15 PROCEDURE — 97161 PT EVAL LOW COMPLEX 20 MIN: CPT | Performed by: PHYSICAL THERAPIST

## 2025-05-15 PROCEDURE — 97110 THERAPEUTIC EXERCISES: CPT | Performed by: PHYSICAL THERAPIST

## 2025-05-15 NOTE — PROGRESS NOTES
Outpatient Rehab    Physical Therapy Evaluation    Patient Name: Demond Hassan  MRN: [id number]  YOB: 1985  Encounter Date: 5/15/2025    Therapy Diagnosis:   Encounter Diagnoses   Name Primary?    Lumbar radiculopathy, chronic     Piriformis syndrome, unspecified laterality     Pain Yes     Physician: Vicenta Thapa DO    Physician Orders: Eval and Treat  Medical Diagnosis: Lumbar radiculopathy, chronic  Piriformis syndrome, unspecified laterality    Visit # / Visits Authorized:  1 / 1  Insurance Authorization Period: 5/1/2025 to 5/1/2026  Date of Evaluation: 5/15/2025  Plan of Care Certification: 5/15/2025 to 7-09-25     Time In: 1330   Time Out: 1405  Total Time (in minutes): 35   Total Billable Time (in minutes): 35    Intake Outcome Measure for FOTO Survey    Therapist reviewed FOTO scores for Demond Hassan on 5/15/2025.   FOTO report - see Media section or FOTO account episode details.     Intake Score:  (see media)        Subjective   History of Present Illness  Demond is a 39 y.o. male who reports to physical therapy with a chief concern of R buttock pain.     The patient reports a medical diagnosis of Lumbar radiculopathy, chronic (M54.16), Piriformis syndrome, unspecified laterality (G57.00).    Diagnostic tests related to this condition: MRI studies.   MRI Studies Details: Mild lumbar degenerative changes.  No spinal canal stenosis or neural foraminal narrowing.    History of Present Condition/Illness: Pt c/o R buttock pain with radiation into thigh and calf at times x 1 yr of insidious onset.  Pt states pain had been off and on but has persisted over the past 3 wks.   Presently, pt c/o pain in R upper, posterior thigh.  States pain mainly when playing tennis, none with running.  States no prior treatment to date.  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    Activities of Daily Living  Social history was obtained from Patient.                     Currently independent with  activities of daily living? Yes     Mild pain.        Pain     Patient reports a current pain level of 0/10. Pain at best is reported as 0/10. Pain at worst is reported as 8/10.   Location: R buttock and LE  Clinical Progression (since onset): Worsening  Pain Qualities: Aching, Dull, Sharp, Tightness  Pain-Relieving Factors: Rest  Pain-Aggravating Factors: Exercise, Sports, Sitting           Past Medical History/Physical Systems Review:   Demond Hassan  has a past medical history of Known health problems: none.    Demond Hassan  has a past surgical history that includes No past surgeries.    Demond has a current medication list which includes the following prescription(s): alprazolam, mupirocin, pregabalin, trelegy ellipta, and zolpidem.    Review of patient's allergies indicates:  No Known Allergies     Objective   Posture        Shoulders are Rounded. Pelvic tilt observed: Posterior  Left pelvis characteristics: Anterior Superior Iliac Spine Higher         Decreased lordosis in sitting.    Lower Extremity Sensation  General Lumbar/Lower Extremity Sensation  Intact: Right and Left  Right Lumbar/Lower Extremity Sensation  Intact: Light Touch, Sharp/Dull Discrimination, Static Two Point Discrimination, Dynamic Two Point Discrimination, Kinesthesia, and Proprioception  Right Lumbar/Lower Extremity Sensation Stocking Glove Pattern: No    Left Lumbar/Lower Extremity Sensation  Intact: Light Touch, Static Two Point Discrimination, Dynamic Two Point Discrimination, Sharp/Dull Discrimination, Kinesthesia, and Proprioception  Left Lumbar/Lower Extremity Sensation Stocking Glove Pattern: No             Spinal Muscle Palpation  Right Spinal Muscle Palpation  Unremarkable: Lumbar/Sacral     Mild TTP R piriformis and upper lateral hams.    Left Spinal Muscle Palpation  Unremarkable: Lumbar/Sacral          Hip Palpation  Right Hip Palpation  Unremarkable: Lumbar/Sacral Muscle          Left Hip Palpation  Unremarkable:  Lumbar/Sacral Muscle               Lumbar Range of Motion   Gross trunk AROM is WNL without pain.      Hip Range of Motion    15 deg IR, others WNL R hip.        Thoracic Trunk Strength  gross trunk/core 4+/5 to 5/5              Lumbar/Pelvic Girdle Special Tests       Lumbar Tests - SLR and Tension  Negative: Right Passive Straight Leg Raise and Left Passive Straight Leg Raise            B hams tightness (-55 deg) and tight R piriformis.             Treatment:   HEP (HSS, bridges, SKTC, piriformis stretch, SL hip abd, prone hip ext, PPT)    Time Entry(in minutes):  PT Evaluation (Low) Time Entry: 25  Therapeutic Exercise Time Entry: 10    Assessment & Plan   Assessment  Demond presents with a condition of Low complexity.   Presentation of Symptoms: Stable  Will Comorbidities Impact Care: No       Functional Limitations: Pain with ADLs/IADLs, Participating in sports  Impairments: Pain with functional activity, Impaired physical strength    Patient Goal for Therapy (PT): decrease pain, improved flexibility and core strength  Prognosis: Good  Assessment Details: Pt to PT with R buttock pain with radicular sx, trunk/core weakness, decreased flexibility and poor posture that limit ADL.    Plan  From a physical therapy perspective, the patient would benefit from: Skilled Rehab Services    Planned therapy interventions include: Therapeutic exercise, Therapeutic activities, Neuromuscular re-education, and Manual therapy.            Visit Frequency: 1 times Per Week for 8 Weeks.       This plan was discussed with Patient.   Discussion participants: Agreed Upon Plan of Care  Plan details: PT prn for up to 8 visits for posture education, trunk/core strengthening and flexibility exercises.          Patient's spiritual, cultural, and educational needs considered and patient agreeable to plan of care and goals.           Goals:   Active       Functional outcome       Patient will demonstrate independence in home program for  support of progression       Start:  05/15/25    Expected End:  07/10/25               Pain       Patient will report pain of < 3/10 demonstrating a reduction of overall pain with tennis       Start:  05/15/25    Expected End:  07/10/25               Strength       Patient will demonstrate 4+/5 to 5/5 trunk/core strength       Start:  05/15/25    Expected End:  07/10/25                Roger Morrell, PT

## 2025-05-26 ENCOUNTER — OFFICE VISIT (OUTPATIENT)
Dept: PAIN MEDICINE | Facility: CLINIC | Age: 40
End: 2025-05-26
Payer: COMMERCIAL

## 2025-05-26 DIAGNOSIS — M79.18 RIGHT BUTTOCK PAIN: Primary | ICD-10-CM

## 2025-05-26 DIAGNOSIS — G57.00 PIRIFORMIS SYNDROME, UNSPECIFIED LATERALITY: ICD-10-CM

## 2025-05-26 PROCEDURE — 98006 SYNCH AUDIO-VIDEO EST MOD 30: CPT | Mod: 95,,, | Performed by: STUDENT IN AN ORGANIZED HEALTH CARE EDUCATION/TRAINING PROGRAM

## 2025-05-26 NOTE — PROGRESS NOTES
"The patient location is: [place]  The chief complaint leading to consultation is: Right buttock pain    Visit type: audiovisual    [facility] - Established Patient Evaluation    Referred by: No ref. provider found   Reason for referral: Right buttock pain     CC:   No chief complaint on file.        5/1/2025     1:41 PM   Last 3 PDI Scores   Pain Disability Index (PDI) 21     Interval History 5/26/2025:     Demond Hassan is a 39 y.o. male presents for virtually follow up.  Since last visit the pain has improved.  He went to 1 session of physical therapy and was given exercises he could perform at home.  Patient does not feel it necessary to continue with PT.  He has been implementing the stretches provided to him and does note improvement in his pain.  He describes a sensation in his right buttocks not as pain, but a "nudge" that will occasionally bother him.  Today I did discuss with him the results of his recent MRI lumbar spine which did show a few broad posterior disc bulges, however no central canal or neuroforaminal narrowing.    The pain is located in the right buttocks area and does not radiate.   Today the pain is rated as 0/10      Subjective 05/01/2025:   Demond Hassan is a 39 y.o. male who presents complaining of right buttocks pain that radiates down the right lower extremity to the lateral calf.  Pain has been intermittent over the last year.  Pain would typically resolve on its own, however has been more persistent over the last few weeks.  Patient notices the pain with certain movements including playing tennis and leaning on the right leg.  No history of spine surgeries.  No history of physical therapy.  No previous injections.  Patient is very active playing tennis and running.    Initial Pain Assessment:  Location: right buttocks    Onset (Approx Date Pain started): intermittent x 1 year, and more persistent over the last few weeks  Current Pain Score: 3/10  Daily " Pain of Range: 0-8/10  Quality: Sharp, Shooting, and muscle stretching   Radiation: right leg to the lateral calf.  Worsened by: certain movements   Improved by: medications     Patient denies urinary incontinence, bowel incontinence, significant weight loss, significant motor weakness, and loss of sensations.      Previous Interventions:  - None    Previous Therapies:  PT/OT: no   Chiropractor:   HEP: yes, including running and tennis  Relevant Surgery: no     Previous Medications:   - Tylenol or NSAIDS: Ibuprofen   - Muscle Relaxants:    - TCAs:   - SNRIs:   - Topicals:   - Anticonvulsants:    - Opioids:   - Adjuvants:     Current Pain Medications:   Ibuprofen PRN    Anticoagulation: no     Review of Systems:  ROS    GENERAL:  No weight loss, malaise or fevers. NO   HEENT:   No recent changes in vision or hearing NO   NECK:  No difficulty with swallowing. No stridor. NO   RESPIRATORY:  Negative for cough, wheezing or shortness of breath, patient denies any recent URI. NO   CARDIOVASCULAR:  Negative for chest pain, leg swelling or palpitations. NO   GI:  Negative for abdominal discomfort, blood in stools or black stools or change in bowel habits. NO   MUSCULOSKELETAL:  See HPI.  SKIN:  Negative for lesions, rash, and itching. NO   PSYCH:  No mood disorder or recent psychosocial stressors.  NO   HEMATOLOGY/LYMPHOLOGY:  Negative for prolonged bleeding, bruising easily or swollen nodes.  Patient is not currently taking any anti-coagulants NO   NEURO:   No history of headaches, syncope, paralysis, seizures or tremors. NO   All other reviewed and negative other than HPI.    History:  Current medications, allergies, medical history, surgical history,   family history, and social history were reviewed in the chart as marked.    Full Medication List:  Current Medications[1]     Allergies:  Patient has no known allergies.     Medical History:   has a past medical history of Known health problems: none.    Surgical  History:   has a past surgical history that includes No past surgeries.    Family History:  family history includes Hypertension in his maternal grandmother.    Social History:   reports that he has never smoked. He has never used smokeless tobacco. He reports current alcohol use of about 5.0 standard drinks of alcohol per week. He reports that he does not use drugs.    Physical Exam:  There were no vitals filed for this visit.    Previous Physical Exam  GENERAL: Well appearing, in no acute distress, alert and oriented x3.  PSYCH:  Mood and affect appropriate.  SKIN: Skin color, texture, turgor normal, no rashes or lesions.  HEAD/FACE:  Normocephalic, atraumatic. Cranial nerves grossly intact.  NECK: Normal ROM. Supple.   CV: RRR with palpation of the radial artery.  PULM: No evidence of respiratory difficulty, symmetric chest rise.  GI:  Soft and non-distended.  MSK: Straight leg raising is negative to radicular pain. + piriformis stretches positive.  No pain to palpation over the facet joints of the lumbar spine. No pain with lumbar facet loading. No pain over the SI joints. DAMI test is negative. No pain over the GTB bilaterally.  Normal range of motion of the lumbar spine.  Milgrams negative.  Peripheral joint ROM is full and pain free without obvious instability or laxity in all four extremities. No obvious deformities, edema, or skin discoloration.  No atrophy or tone abnormalities are noted.   NEURO: Bilateral upper and lower extremity coordination and strength is symmetric.  No loss of sensation is noted. No clonus.   MENTAL STATUS: A x O x 3, good concentration, speech is fluent and goal directed  MOTOR: 5/5 in all  muscle groups  GAIT: Normal.  Ambulates unassisted.    Virtual Visit PE 05/26/2025  GEN: No acute distress. Calm, comfortable  HENT: Normocephalic, atraumatic, moist mucous membranes  EYE: Anicteric sclera, non-injected  CV: Non-diaphoretic.  CHEST: Breathing comfortably. Chest expansion  symmetric  EXT: No clubbing, cyanosis.   Psych: Mood and affect are appropriate  GAIT: Independent, normal ambulation    Imaging:  LUMBAR MRI  Results for orders placed during the hospital encounter of 05/14/25    MRI Lumbar Spine Without Contrast    Narrative  EXAMINATION:  MRI LUMBAR SPINE WITHOUT CONTRAST    CLINICAL HISTORY:  Lumbar radiculopathy, symptoms persist with conservative treatment; Radiculopathy, lumbar region    TECHNIQUE:  Multiplanar, multisequence MR images were acquired from the thoracolumbar junction to the sacrum without the administration of contrast.    COMPARISON:  None.    FINDINGS:  Lumbar spine alignment is normal.  No spondylolysis.  Vertebral body heights are well maintained.  No acute fractures.  No marrow signal abnormality to suggest an infiltrative process.    Intervertebral discs demonstrate normal height and signal intensity.  No endplate edema.    Conus medullaris terminates at L1.  Cauda equina is normal.    Partially visualized T2 hyperintense lesion within the posterior right lobe.  Left renal T2 hyperintense lesion, likely a cyst.  SI joints are symmetric.  Paraspinal musculature demonstrates normal bulk and signal intensity.    T12-L1: No spinal canal stenosis or neural foraminal narrowing.    L1-L2: No spinal canal stenosis or neural foraminal narrowing.    L2-L3: Posterior broad-based disc bulge.  No spinal canal stenosis or neural foraminal narrowing.    L3-L4: Posterior broad-based disc bulge.  No spinal canal stenosis or neural foraminal narrowing.    L4-L5: Posterior broad-based disc bulge.  No spinal canal stenosis or neural foraminal narrowing.    L5-S1: No spinal canal stenosis or neural foraminal narrowing.    Impression  Mild lumbar degenerative changes.  No spinal canal stenosis or neural foraminal narrowing.      Electronically signed by: Mitch Price MD  Date:    05/14/2025  Time:    14:47      Labs:  BMP  Lab Results   Component Value Date      07/27/2006    K 3.9 07/27/2006     07/27/2006    CO2 25 07/27/2006    BUN 13 07/27/2006    CREATININE 1.0 07/27/2006    CALCIUM 10.1 07/27/2006     Lab Results   Component Value Date    ALT 21 07/27/2006    AST 18 07/27/2006    ALKPHOS 67 07/27/2006    BILITOT 0.5 07/27/2006     Lab Results   Component Value Date    WBC 5.57 07/27/2006    HGB 15.6 07/27/2006    HCT 45.9 07/27/2006    MCV 84.1 07/27/2006     07/27/2006           Assessment:  Problem List Items Addressed This Visit    None  Visit Diagnoses         Right buttock pain    -  Primary      Piriformis syndrome, unspecified laterality                  05/01/2025 - Demond Hassan is a 39 y.o. male who  has a past medical history of Known health problems: none.  By history and examination this patient has chronic low back pain with radiculopathy on the right, possibly piriformis syndrome as well. We discussed the underlying diagnoses and multiple treatment options including non-opioid medications, interventional procedures, and physical therapy.  Referral to physical therapy.  MRI ordered of the lumbar spine.  Discuss procedural options including epidural +/- piriformis injection.  Trial of Lyrica.  The risks and benefits of each treatment option were discussed and all questions were answered.     05/26/2025 - Patient presents virtually for follow up of right-sided buttock pain.  MRI showed some mild degenerative disc changes in the lower lumbar spine.  No stenosis, no foraminal narrowing.  Pain has improved with home stretching.  Continue with conservative measures.    Treatment Plan:   Procedures:  None at this time.  Consider right piriformis injection in the future if needed.  PT/OT/HEP: I have stressed the importance of physical activity and a home exercise plan to help with pain and improve health.  You may continue with the home stretching, not interested in continue with PT.  Medications:    - may continue with ibuprofen PRN  - may  continue with Lyrica if beneficial.   -  Reviewed and consistent with medication use as prescribed.  Imaging:  Lumbar MRI reviewed with the patient..  Follow Up:  JUSTIN Thapa DO   [facility] Pain [facility] / Anesthesiology    Disclaimer: This note was partly generated using dictation software which may occasionally result in transcription errors.           [1]   Current Outpatient Medications:     ALPRAZolam (XANAX) 0.25 MG tablet, Take 1 tablet (0.25 mg total) by mouth nightly as needed for Insomnia., Disp: 20 tablet, Rfl: 1    mupirocin (BACTROBAN) 2 % ointment, Apply to affected area 3 times daily, Disp: 22 g, Rfl: 1    pregabalin (LYRICA) 50 MG capsule, Take 1 capsule (50 mg total) by mouth 3 (three) times daily., Disp: 90 capsule, Rfl: 0    TRELEGY ELLIPTA 100-62.5-25 mcg DsDv, SMARTSI Inhalation By Mouth Daily, Disp: , Rfl:     zolpidem (AMBIEN CR) 12.5 MG CR tablet, Take 1 tablet by mouth nightly as needed., Disp: , Rfl:

## 2025-06-30 NOTE — TELEPHONE ENCOUNTER
Follow up with your primary doctor/outpatient providers, and return to the emergency department for new or worsening symptoms.   Spoke with pt in regards to provider being out of clinic on 2/24. Pt states that he will be out of town Friday when offered a f/u that afternoon. Pt states that he will call back next week to r/s. Pt verbalized understanding.